# Patient Record
Sex: FEMALE | Race: WHITE | NOT HISPANIC OR LATINO | Employment: FULL TIME | URBAN - METROPOLITAN AREA
[De-identification: names, ages, dates, MRNs, and addresses within clinical notes are randomized per-mention and may not be internally consistent; named-entity substitution may affect disease eponyms.]

---

## 2017-02-22 PROBLEM — K58.9 IBS (IRRITABLE BOWEL SYNDROME): Status: ACTIVE | Noted: 2017-02-22

## 2017-02-22 PROBLEM — K63.5 POLYP OF COLON: Status: ACTIVE | Noted: 2017-02-22

## 2017-02-22 PROBLEM — Z78.0 MENOPAUSE: Status: ACTIVE | Noted: 2017-02-22

## 2017-04-14 ENCOUNTER — LAB (OUTPATIENT)
Dept: INTERNAL MEDICINE | Facility: CLINIC | Age: 57
End: 2017-04-14

## 2017-04-14 DIAGNOSIS — Z00.00 ENCOUNTER FOR ANNUAL PHYSICAL EXAM: Primary | ICD-10-CM

## 2017-04-14 LAB
ALBUMIN SERPL-MCNC: 4.3 G/DL (ref 3.2–4.8)
ALBUMIN/GLOB SERPL: 1.4 G/DL (ref 1.5–2.5)
ALP SERPL-CCNC: 62 U/L (ref 25–100)
ALT SERPL W P-5'-P-CCNC: 17 U/L (ref 7–40)
ANION GAP SERPL CALCULATED.3IONS-SCNC: 6 MMOL/L (ref 3–11)
ARTICHOKE IGE QN: 116 MG/DL (ref 0–130)
AST SERPL-CCNC: 23 U/L (ref 0–33)
BASOPHILS # BLD AUTO: 0.07 10*3/MM3 (ref 0–0.2)
BASOPHILS NFR BLD AUTO: 1.8 % (ref 0–1)
BILIRUB SERPL-MCNC: 0.7 MG/DL (ref 0.3–1.2)
BUN BLD-MCNC: 14 MG/DL (ref 9–23)
BUN/CREAT SERPL: 20 (ref 7–25)
CALCIUM SPEC-SCNC: 9.9 MG/DL (ref 8.7–10.4)
CHLORIDE SERPL-SCNC: 108 MMOL/L (ref 99–109)
CHOLEST SERPL-MCNC: 244 MG/DL (ref 0–200)
CO2 SERPL-SCNC: 27 MMOL/L (ref 20–31)
CREAT BLD-MCNC: 0.7 MG/DL (ref 0.6–1.3)
DEPRECATED RDW RBC AUTO: 45.9 FL (ref 37–54)
EOSINOPHIL # BLD AUTO: 0.16 10*3/MM3 (ref 0.1–0.3)
EOSINOPHIL NFR BLD AUTO: 4 % (ref 0–3)
ERYTHROCYTE [DISTWIDTH] IN BLOOD BY AUTOMATED COUNT: 12.5 % (ref 11.3–14.5)
GFR SERPL CREATININE-BSD FRML MDRD: 86 ML/MIN/1.73
GLOBULIN UR ELPH-MCNC: 3 GM/DL
GLUCOSE BLD-MCNC: 86 MG/DL (ref 70–100)
HCT VFR BLD AUTO: 43.7 % (ref 34.5–44)
HDLC SERPL-MCNC: 110 MG/DL (ref 40–60)
HGB BLD-MCNC: 13.9 G/DL (ref 11.5–15.5)
IMM GRANULOCYTES # BLD: 0 10*3/MM3 (ref 0–0.03)
IMM GRANULOCYTES NFR BLD: 0 % (ref 0–0.6)
LYMPHOCYTES # BLD AUTO: 1.94 10*3/MM3 (ref 0.6–4.8)
LYMPHOCYTES NFR BLD AUTO: 48.7 % (ref 24–44)
MCH RBC QN AUTO: 31.7 PG (ref 27–31)
MCHC RBC AUTO-ENTMCNC: 31.8 G/DL (ref 32–36)
MCV RBC AUTO: 99.8 FL (ref 80–99)
MONOCYTES # BLD AUTO: 0.35 10*3/MM3 (ref 0–1)
MONOCYTES NFR BLD AUTO: 8.8 % (ref 0–12)
NEUTROPHILS # BLD AUTO: 1.46 10*3/MM3 (ref 1.5–8.3)
NEUTROPHILS NFR BLD AUTO: 36.7 % (ref 41–71)
PLATELET # BLD AUTO: 307 10*3/MM3 (ref 150–450)
PMV BLD AUTO: 10.7 FL (ref 6–12)
POTASSIUM BLD-SCNC: 5.2 MMOL/L (ref 3.5–5.5)
PROT SERPL-MCNC: 7.3 G/DL (ref 5.7–8.2)
RBC # BLD AUTO: 4.38 10*6/MM3 (ref 3.89–5.14)
SODIUM BLD-SCNC: 141 MMOL/L (ref 132–146)
TRIGL SERPL-MCNC: 60 MG/DL (ref 0–150)
TSH SERPL DL<=0.05 MIU/L-ACNC: 1.63 MIU/ML (ref 0.35–5.35)
WBC NRBC COR # BLD: 3.98 10*3/MM3 (ref 3.5–10.8)

## 2017-04-14 PROCEDURE — 80061 LIPID PANEL: CPT | Performed by: INTERNAL MEDICINE

## 2017-04-14 PROCEDURE — 80053 COMPREHEN METABOLIC PANEL: CPT | Performed by: INTERNAL MEDICINE

## 2017-04-14 PROCEDURE — 85025 COMPLETE CBC W/AUTO DIFF WBC: CPT | Performed by: INTERNAL MEDICINE

## 2017-04-14 PROCEDURE — 84443 ASSAY THYROID STIM HORMONE: CPT | Performed by: INTERNAL MEDICINE

## 2017-04-16 DIAGNOSIS — D75.89 MACROCYTOSIS: Primary | ICD-10-CM

## 2017-05-03 ENCOUNTER — OFFICE VISIT (OUTPATIENT)
Dept: INTERNAL MEDICINE | Facility: CLINIC | Age: 57
End: 2017-05-03

## 2017-05-03 VITALS
BODY MASS INDEX: 23.19 KG/M2 | SYSTOLIC BLOOD PRESSURE: 104 MMHG | HEIGHT: 62 IN | WEIGHT: 126 LBS | RESPIRATION RATE: 12 BRPM | DIASTOLIC BLOOD PRESSURE: 72 MMHG | TEMPERATURE: 99 F | OXYGEN SATURATION: 98 % | HEART RATE: 77 BPM

## 2017-05-03 DIAGNOSIS — Z00.00 ENCOUNTER FOR ANNUAL PHYSICAL EXAM: Primary | ICD-10-CM

## 2017-05-03 DIAGNOSIS — Z12.11 COLON CANCER SCREENING: ICD-10-CM

## 2017-05-03 LAB
BILIRUB BLD-MCNC: NEGATIVE MG/DL
CLARITY, POC: CLEAR
COLOR UR: YELLOW
GLUCOSE UR STRIP-MCNC: NEGATIVE MG/DL
KETONES UR QL: NEGATIVE
LEUKOCYTE EST, POC: NEGATIVE
NITRITE UR-MCNC: NEGATIVE MG/ML
PH UR: 6.5 [PH] (ref 5–8)
PROT UR STRIP-MCNC: NEGATIVE MG/DL
RBC # UR STRIP: NEGATIVE /UL
SP GR UR: 1.01 (ref 1–1.03)
UROBILINOGEN UR QL: NORMAL

## 2017-05-03 PROCEDURE — 81003 URINALYSIS AUTO W/O SCOPE: CPT | Performed by: INTERNAL MEDICINE

## 2017-05-03 PROCEDURE — 99396 PREV VISIT EST AGE 40-64: CPT | Performed by: INTERNAL MEDICINE

## 2017-05-03 RX ORDER — TRETINOIN 0.5 MG/G
1 CREAM TOPICAL DAILY
COMMUNITY
End: 2018-08-03 | Stop reason: SDUPTHER

## 2017-12-04 ENCOUNTER — TRANSCRIBE ORDERS (OUTPATIENT)
Dept: ADMINISTRATIVE | Facility: HOSPITAL | Age: 57
End: 2017-12-04

## 2017-12-04 DIAGNOSIS — Z12.31 VISIT FOR SCREENING MAMMOGRAM: Primary | ICD-10-CM

## 2018-01-15 ENCOUNTER — HOSPITAL ENCOUNTER (OUTPATIENT)
Dept: MAMMOGRAPHY | Facility: HOSPITAL | Age: 58
Discharge: HOME | End: 2018-01-15
Admitting: INTERNAL MEDICINE

## 2018-01-15 DIAGNOSIS — Z12.31 VISIT FOR SCREENING MAMMOGRAM: ICD-10-CM

## 2018-01-15 PROCEDURE — 77063 BREAST TOMOSYNTHESIS BI: CPT | Performed by: RADIOLOGY

## 2018-01-15 PROCEDURE — 77063 BREAST TOMOSYNTHESIS BI: CPT

## 2018-01-15 PROCEDURE — 77067 SCR MAMMO BI INCL CAD: CPT | Performed by: RADIOLOGY

## 2018-01-15 PROCEDURE — 77067 SCR MAMMO BI INCL CAD: CPT

## 2018-08-02 ENCOUNTER — TELEPHONE (OUTPATIENT)
Dept: INTERNAL MEDICINE | Facility: CLINIC | Age: 58
End: 2018-08-02

## 2018-08-02 NOTE — PROGRESS NOTES
"Here for physical    Exercise: runs daily, weights  Diet: healthy overall, citricel, tumeric. Zija. Not a lot of dairy    The following portions of the patient's history were reviewed and updated as appropriate: allergies, current medications, past family history, past medical history, past social history, past surgical history and problem list.    Review of Systems   Constitutional: Negative for fatigue and fever. no change in weight  HENT: Negative for congestion and sore throat.    Eyes: Negative for visual disturbance.   Respiratory: Negative for cough and shortness of breath.    Cardiovascular: Negative for chest pain, palpitations and leg swelling.   Gastrointestinal: Negative for abdominal distention, abdominal pain, blood in stool, constipation, diarrhea and nausea.   Genitourinary: occasional UTIs;   Musculoskeletal: Negative for arthralgias. L knee meniscus tear - doing better  Skin: Negative for rash.   Allergic/Immunologic: Negative for immunocompromised state.   Neurological: Negative for dizziness and headaches.   Psychiatric/Behavioral: Negative for dysphoric mood and sleep disturbance.       Objective    Vitals:    08/03/18 0825   BP: 102/78   BP Location: Right arm   Pulse: 68   Temp: 98.2 °F (36.8 °C)   TempSrc: Temporal Artery    SpO2: 98%   Weight: 57.3 kg (126 lb 6.4 oz)   Height: 156.8 cm (61.75\")     Physical Exam   Constitutional: She is oriented to person, place, and time. She appears well-developed and well-nourished. No distress.   HENT:   Head: Normocephalic and atraumatic.   Right Ear: External ear normal.   Left Ear: External ear normal.   Mouth/Throat: Oropharynx is clear and moist. No oropharyngeal exudate.   Eyes: Conjunctivae and EOM are normal. Pupils are equal, round, and reactive to light.   Neck: Normal range of motion. Neck supple. No thyromegaly present.   Cardiovascular: Normal rate, regular rhythm, normal heart sounds and intact distal pulses.  Exam reveals no gallop and no " friction rub.    No murmur heard.  Pulmonary/Chest: Effort normal and breath sounds normal. No respiratory distress. She has no wheezes. She has no rales.   Breast exam: no masses, no tenderness, no skin lesions B  Abdominal: Soft. Bowel sounds are normal. She exhibits no mass. There is no tenderness. There is no rebound and no guarding.   Musculoskeletal: Normal range of motion. She exhibits no edema or deformity.   Lymphadenopathy:     She has no cervical adenopathy.   Neurological: She is alert and oriented to person, place, and time. No cranial nerve deficit. She exhibits normal muscle tone. Coordination normal.   Skin: Skin is warm and dry. She is not diaphoretic.   Psychiatric: She has a normal mood and affect. Her behavior is normal. Judgment and thought content normal.       Assessment/Plan   Lila was seen today for annual exam.    Diagnoses and all orders for this visit:    Routine general medical examination at a health care facility  Regular exercise/healthy diet. BSE q month. Sunscreen use encouraged. calcium intake reviewed. Check fasting labs  Lilian due 1/19  Colon due '22 (she had last year - we have requested from Dr Quach's office)  DT due this year  DEXA due 4/21 (normal in 4/16)  Shingles - shingrix discussed -  can check at pharmacy since we do not have   Doesn't meet criteria for lung cancer screening  paps through GYN - [facility] Bob  -     CBC & Differential  -     Comprehensive Metabolic Panel  -     Vitamin D 25 Hydroxy  -     TSH  -     Hepatitis C Antibody  -     Lipid Panel  -     POC Urinalysis Dipstick, Automated    Need for hepatitis C screening test  -     Hepatitis C Antibody    Need for diphtheria-tetanus-pertussis (Tdap) vaccine  -     Tdap Vaccine Greater Than or Equal To 6yo IM

## 2018-08-02 NOTE — TELEPHONE ENCOUNTER
----- Message from Emily Ott MD sent at 8/2/2018  7:51 AM EDT -----  Regarding: colon reprot  Can we see if Pemarycruz's office has a colonoscopy on her from 2017? We had referred her, but not in chart

## 2018-08-03 ENCOUNTER — OFFICE VISIT (OUTPATIENT)
Dept: INTERNAL MEDICINE | Facility: CLINIC | Age: 58
End: 2018-08-03

## 2018-08-03 VITALS
BODY MASS INDEX: 23.26 KG/M2 | HEART RATE: 68 BPM | WEIGHT: 126.4 LBS | OXYGEN SATURATION: 98 % | DIASTOLIC BLOOD PRESSURE: 78 MMHG | TEMPERATURE: 98.2 F | HEIGHT: 62 IN | SYSTOLIC BLOOD PRESSURE: 102 MMHG

## 2018-08-03 DIAGNOSIS — Z23 NEED FOR DIPHTHERIA-TETANUS-PERTUSSIS (TDAP) VACCINE: ICD-10-CM

## 2018-08-03 DIAGNOSIS — Z00.00 ROUTINE GENERAL MEDICAL EXAMINATION AT A HEALTH CARE FACILITY: Primary | ICD-10-CM

## 2018-08-03 DIAGNOSIS — Z11.59 NEED FOR HEPATITIS C SCREENING TEST: ICD-10-CM

## 2018-08-03 LAB
BILIRUB BLD-MCNC: ABNORMAL MG/DL
CLARITY, POC: CLEAR
COLOR UR: YELLOW
GLUCOSE UR STRIP-MCNC: NEGATIVE MG/DL
KETONES UR QL: NEGATIVE
LEUKOCYTE EST, POC: NEGATIVE
NITRITE UR-MCNC: NEGATIVE MG/ML
PH UR: 6 [PH] (ref 5–8)
PROT UR STRIP-MCNC: NEGATIVE MG/DL
RBC # UR STRIP: NEGATIVE /UL
SP GR UR: 1.02 (ref 1–1.03)
UROBILINOGEN UR QL: NORMAL

## 2018-08-03 PROCEDURE — 90471 IMMUNIZATION ADMIN: CPT | Performed by: INTERNAL MEDICINE

## 2018-08-03 PROCEDURE — 90715 TDAP VACCINE 7 YRS/> IM: CPT | Performed by: INTERNAL MEDICINE

## 2018-08-03 PROCEDURE — 99396 PREV VISIT EST AGE 40-64: CPT | Performed by: INTERNAL MEDICINE

## 2018-08-03 PROCEDURE — 81003 URINALYSIS AUTO W/O SCOPE: CPT | Performed by: INTERNAL MEDICINE

## 2018-08-03 RX ORDER — TRETINOIN 0.5 MG/G
1 CREAM TOPICAL DAILY
Qty: 20 G | Refills: 5 | Status: SHIPPED | OUTPATIENT
Start: 2018-08-03 | End: 2019-08-05 | Stop reason: SDUPTHER

## 2019-02-27 ENCOUNTER — TRANSCRIBE ORDERS (OUTPATIENT)
Dept: INTERNAL MEDICINE | Facility: CLINIC | Age: 59
End: 2019-02-27

## 2019-02-27 DIAGNOSIS — Z12.31 VISIT FOR SCREENING MAMMOGRAM: Primary | ICD-10-CM

## 2019-04-15 ENCOUNTER — HOSPITAL ENCOUNTER (OUTPATIENT)
Dept: MAMMOGRAPHY | Facility: HOSPITAL | Age: 59
Discharge: HOME | End: 2019-04-15
Admitting: INTERNAL MEDICINE

## 2019-04-15 DIAGNOSIS — Z12.31 VISIT FOR SCREENING MAMMOGRAM: ICD-10-CM

## 2019-04-15 PROCEDURE — 77063 BREAST TOMOSYNTHESIS BI: CPT | Performed by: RADIOLOGY

## 2019-04-15 PROCEDURE — 77067 SCR MAMMO BI INCL CAD: CPT

## 2019-04-15 PROCEDURE — 77067 SCR MAMMO BI INCL CAD: CPT | Performed by: RADIOLOGY

## 2019-04-15 PROCEDURE — 77063 BREAST TOMOSYNTHESIS BI: CPT

## 2019-04-17 ENCOUNTER — APPOINTMENT (OUTPATIENT)
Dept: MAMMOGRAPHY | Facility: HOSPITAL | Age: 59
End: 2019-04-17

## 2019-08-03 NOTE — PROGRESS NOTES
"Here for physical    Exercise: runs daily, weights  Diet: healthy overall, takes citricel, tumeric. Zija, fish oil. Not a lot of dairy in diet but some    She had blood work done through work  recently and brings her results today.  HDL is 99.  LDL is 127.  Triglycerides are 63.  Total cholesterol is 241.  Glucose is 80.    The following portions of the patient's history were reviewed and updated as appropriate: allergies, current medications, past family history, past medical history, past social history, past surgical history and problem list.    Review of Systems   Constitutional: Negative for activity change, appetite change, fever, unexpected weight gain and unexpected weight loss.   HENT: Negative.    Eyes: Negative.         She did see her eye doctor and had slightly elevated pressures so has to go in for recheck   Respiratory: Negative for shortness of breath and wheezing.    Cardiovascular: Negative for chest pain, palpitations and leg swelling.   Gastrointestinal: Negative.    Endocrine: Negative.    Genitourinary: Negative for difficulty urinating, dysuria and frequency.   Skin: Negative.    Allergic/Immunologic: Negative for immunocompromised state.   Neurological: Negative for seizures, speech difficulty, memory problem and confusion.   Hematological: Does not bruise/bleed easily.   Psychiatric/Behavioral: Negative for agitation.         Objective    /76 (BP Location: Right arm)   Pulse 62   Temp 97.9 °F (36.6 °C) (Temporal)   Ht 156.7 cm (61.7\")   Wt 59 kg (130 lb)   SpO2 99%   BMI 24.01 kg/m²   Physical Exam   Physical Exam   Constitutional: She is oriented to person, place, and time. She appears well-developed and well-nourished. No distress.   HENT:   Head: Normocephalic and atraumatic.   Right Ear: External ear normal.   Left Ear: External ear normal.   Nose: Nose normal.   Mouth/Throat: Oropharynx is clear and moist. No oropharyngeal exudate.   Eyes: Conjunctivae and EOM are normal. " Pupils are equal, round, and reactive to light. Right eye exhibits no discharge. Left eye exhibits no discharge. No scleral icterus.   Neck: Normal range of motion. Neck supple. No thyromegaly present.   Cardiovascular: Normal rate, regular rhythm, normal heart sounds and intact distal pulses. Exam reveals no gallop and no friction rub.   No murmur heard.  Pulmonary/Chest: Effort normal and breath sounds normal. No respiratory distress. She has no wheezes. She has no rales.   Abdominal: Soft. Bowel sounds are normal. She exhibits no distension and no mass. There is no tenderness. There is no rebound and no guarding.   Musculoskeletal: Normal range of motion. She exhibits no edema or deformity.   Lymphadenopathy:     She has no cervical adenopathy.   Neurological: She is alert and oriented to person, place, and time. She displays normal reflexes. Coordination normal.   Skin: Skin is warm and dry. No rash noted. She is not diaphoretic. No erythema. No pallor.   Psychiatric: She has a normal mood and affect. Her behavior is normal. Judgment and thought content normal.   Nursing note and vitals reviewed.        Assessment/Plan   Lila was seen today for annual exam.    Diagnoses and all orders for this visit:    Routine general medical examination at a health care facility  -     Comprehensive Metabolic Panel  -     CBC & Differential  -     TSH  -     POC Urinalysis Dipstick, Automated    Need for hepatitis A immunization  -     Hepatitis A Vaccine Adult IM      Regular exercise/healthy diet. BSE q month. Sunscreen use encouraged. calcium intake reviewed.  She had cholesterol and sugar done recently.  I gave her order for the rest of the physical labs, including hep C screen, to be done at [facility] because of her insurance  Lilian due 4/20  Colon due 6/22 ([facility])  DT due 8/28  Hep A -will give today.  DEXA due 4/21 (normal in 4/16)  Shingles - shingrix discussed -  can check at pharmacy since we do not have   Paps through  GYN-Izabella Vega  UA shows leukocytes but patient without symptoms so will not treat

## 2019-08-05 ENCOUNTER — OFFICE VISIT (OUTPATIENT)
Dept: INTERNAL MEDICINE | Facility: CLINIC | Age: 59
End: 2019-08-05

## 2019-08-05 VITALS
WEIGHT: 130 LBS | BODY MASS INDEX: 23.92 KG/M2 | TEMPERATURE: 97.9 F | HEIGHT: 62 IN | DIASTOLIC BLOOD PRESSURE: 76 MMHG | HEART RATE: 62 BPM | OXYGEN SATURATION: 99 % | SYSTOLIC BLOOD PRESSURE: 118 MMHG

## 2019-08-05 DIAGNOSIS — Z23 NEED FOR HEPATITIS A IMMUNIZATION: ICD-10-CM

## 2019-08-05 DIAGNOSIS — Z00.00 ROUTINE GENERAL MEDICAL EXAMINATION AT A HEALTH CARE FACILITY: Primary | ICD-10-CM

## 2019-08-05 LAB
BILIRUB BLD-MCNC: NEGATIVE MG/DL
CLARITY, POC: CLEAR
COLOR UR: YELLOW
GLUCOSE UR STRIP-MCNC: NEGATIVE MG/DL
KETONES UR QL: NEGATIVE
LEUKOCYTE EST, POC: ABNORMAL
NITRITE UR-MCNC: NEGATIVE MG/ML
PH UR: 8 [PH] (ref 5–8)
PROT UR STRIP-MCNC: NEGATIVE MG/DL
RBC # UR STRIP: NEGATIVE /UL
SP GR UR: 1.01 (ref 1–1.03)
UROBILINOGEN UR QL: NORMAL

## 2019-08-05 PROCEDURE — 90632 HEPA VACCINE ADULT IM: CPT | Performed by: INTERNAL MEDICINE

## 2019-08-05 PROCEDURE — 81003 URINALYSIS AUTO W/O SCOPE: CPT | Performed by: INTERNAL MEDICINE

## 2019-08-05 PROCEDURE — 90471 IMMUNIZATION ADMIN: CPT | Performed by: INTERNAL MEDICINE

## 2019-08-05 PROCEDURE — 99396 PREV VISIT EST AGE 40-64: CPT | Performed by: INTERNAL MEDICINE

## 2019-08-05 RX ORDER — TRETINOIN 0.5 MG/G
1 CREAM TOPICAL DAILY
Qty: 20 G | Refills: 5 | Status: SHIPPED | OUTPATIENT
Start: 2019-08-05 | End: 2020-08-06

## 2019-08-20 ENCOUNTER — OFFICE VISIT (OUTPATIENT)
Dept: INTERNAL MEDICINE | Facility: CLINIC | Age: 59
End: 2019-08-20

## 2019-08-20 VITALS
DIASTOLIC BLOOD PRESSURE: 70 MMHG | OXYGEN SATURATION: 98 % | WEIGHT: 131.4 LBS | SYSTOLIC BLOOD PRESSURE: 110 MMHG | TEMPERATURE: 98.3 F | BODY MASS INDEX: 24.18 KG/M2 | HEIGHT: 62 IN | RESPIRATION RATE: 16 BRPM | HEART RATE: 76 BPM

## 2019-08-20 DIAGNOSIS — L30.9 DERMATITIS: Primary | ICD-10-CM

## 2019-08-20 PROCEDURE — 96372 THER/PROPH/DIAG INJ SC/IM: CPT | Performed by: NURSE PRACTITIONER

## 2019-08-20 PROCEDURE — 99214 OFFICE O/P EST MOD 30 MIN: CPT | Performed by: NURSE PRACTITIONER

## 2019-08-20 RX ORDER — TRIAMCINOLONE ACETONIDE 1 MG/G
CREAM TOPICAL 2 TIMES DAILY
Qty: 45 G | Refills: 0 | Status: SHIPPED | OUTPATIENT
Start: 2019-08-20 | End: 2020-08-06

## 2019-08-20 RX ORDER — DEXAMETHASONE SODIUM PHOSPHATE 4 MG/ML
8 INJECTION, SOLUTION INTRA-ARTICULAR; INTRALESIONAL; INTRAMUSCULAR; INTRAVENOUS; SOFT TISSUE ONCE
Status: COMPLETED | OUTPATIENT
Start: 2019-08-20 | End: 2019-08-20

## 2019-08-20 RX ORDER — METHYLPREDNISOLONE 4 MG/1
TABLET ORAL
Qty: 21 EACH | Refills: 0 | Status: SHIPPED | OUTPATIENT
Start: 2019-08-20 | End: 2020-08-06

## 2019-08-20 RX ADMIN — DEXAMETHASONE SODIUM PHOSPHATE 8 MG: 4 INJECTION, SOLUTION INTRA-ARTICULAR; INTRALESIONAL; INTRAMUSCULAR; INTRAVENOUS; SOFT TISSUE at 09:28

## 2019-08-20 NOTE — PROGRESS NOTES
Subjective   Lila Rodriguez is a 59 y.o. female    Chief Complaint   Patient presents with   • Rash     under the right arm and right side. More itchy than painful as of now.      Rash   This is a new problem. The current episode started yesterday. The problem is unchanged. Location: right arm and right trunck. The rash is characterized by redness and itchiness. It is unknown if there was an exposure to a precipitant. Pertinent negatives include no anorexia, congestion, cough, diarrhea, eye pain, facial edema, fatigue, fever, joint pain, nail changes, rhinorrhea, shortness of breath, sore throat or vomiting. Past treatments include antihistamine. The treatment provided mild relief. Her past medical history is significant for varicella. There is no history of allergies, asthma or eczema.        The following portions of the patient's history were reviewed and updated as appropriate: allergies, current medications, past family history, past medical history, past social history, past surgical history and problem list.    Current Outpatient Medications:   •  methylPREDNISolone (MEDROL) 4 MG tablet, Take as directed on package instructions., Disp: 21 each, Rfl: 0  •  Tretinoin, Facial Wrinkles, (TRETINOIN, EMOLLIENT,) 0.05 % cream, Apply 1 application topically Daily., Disp: 20 g, Rfl: 5  •  triamcinolone (KENALOG) 0.1 % cream, Apply  topically to the appropriate area as directed 2 (Two) Times a Day., Disp: 45 g, Rfl: 0  No current facility-administered medications for this visit.      Review of Systems   Constitutional: Negative for chills, fatigue and fever.   HENT: Negative for congestion, rhinorrhea and sore throat.    Eyes: Negative for pain.   Respiratory: Negative for cough, chest tightness and shortness of breath.    Cardiovascular: Negative for chest pain.   Gastrointestinal: Negative for abdominal pain, anorexia, diarrhea, nausea and vomiting.   Endocrine: Negative for cold intolerance and heat intolerance.  "  Musculoskeletal: Negative for arthralgias and joint pain.   Skin: Positive for rash. Negative for nail changes.   Neurological: Negative for dizziness.       Objective   Physical Exam   Constitutional: She is oriented to person, place, and time. She appears well-developed and well-nourished.   HENT:   Head: Normocephalic and atraumatic.   Eyes: Conjunctivae and EOM are normal. Pupils are equal, round, and reactive to light.   Neck: Normal range of motion.   Cardiovascular: Normal rate, regular rhythm and normal heart sounds.   Pulmonary/Chest: Effort normal and breath sounds normal.   Abdominal: Soft. Bowel sounds are normal.   Musculoskeletal: Normal range of motion.   Neurological: She is alert and oriented to person, place, and time. She has normal reflexes.   Skin: Skin is warm and dry. Rash (right arm and trunk) noted. Rash is maculopapular.   Psychiatric: She has a normal mood and affect. Her behavior is normal. Judgment and thought content normal.     Vitals:    08/20/19 0903   BP: 110/70   Pulse: 76   Resp: 16   Temp: 98.3 °F (36.8 °C)   TempSrc: Temporal   SpO2: 98%   Weight: 59.6 kg (131 lb 6.4 oz)   Height: 156.7 cm (61.69\")         Assessment/Plan   Lila was seen today for rash.    Diagnoses and all orders for this visit:    Dermatitis  -     dexamethasone (DECADRON) injection 8 mg  -     methylPREDNISolone (MEDROL) 4 MG tablet; Take as directed on package instructions.  -     triamcinolone (KENALOG) 0.1 % cream; Apply  topically to the appropriate area as directed 2 (Two) Times a Day.    Steroid injection given in office today  Medrol Dosepak as directed to start in the morning  Triamcinolone cream to rash twice daily  Patient will take Claritin in the a.m. and Benadryl at night as needed  Return to the clinic if symptoms worsen or do not improve             "

## 2020-06-03 ENCOUNTER — TRANSCRIBE ORDERS (OUTPATIENT)
Dept: ADMINISTRATIVE | Facility: HOSPITAL | Age: 60
End: 2020-06-03

## 2020-06-03 DIAGNOSIS — Z12.31 VISIT FOR SCREENING MAMMOGRAM: Primary | ICD-10-CM

## 2020-08-06 ENCOUNTER — OFFICE VISIT (OUTPATIENT)
Dept: INTERNAL MEDICINE | Facility: CLINIC | Age: 60
End: 2020-08-06

## 2020-08-06 ENCOUNTER — LAB (OUTPATIENT)
Dept: LAB | Facility: HOSPITAL | Age: 60
End: 2020-08-06

## 2020-08-06 VITALS
HEART RATE: 72 BPM | HEIGHT: 62 IN | OXYGEN SATURATION: 99 % | BODY MASS INDEX: 23.63 KG/M2 | TEMPERATURE: 97.3 F | WEIGHT: 128.4 LBS | SYSTOLIC BLOOD PRESSURE: 104 MMHG | DIASTOLIC BLOOD PRESSURE: 66 MMHG

## 2020-08-06 DIAGNOSIS — Z00.00 ENCOUNTER FOR ANNUAL PHYSICAL EXAM: Primary | ICD-10-CM

## 2020-08-06 DIAGNOSIS — F41.8 SITUATIONAL ANXIETY: ICD-10-CM

## 2020-08-06 DIAGNOSIS — E55.9 VITAMIN D DEFICIENCY: ICD-10-CM

## 2020-08-06 DIAGNOSIS — Z23 NEED FOR HEPATITIS A IMMUNIZATION: ICD-10-CM

## 2020-08-06 DIAGNOSIS — Z00.00 ENCOUNTER FOR ANNUAL PHYSICAL EXAM: ICD-10-CM

## 2020-08-06 LAB
25(OH)D3 SERPL-MCNC: 37.3 NG/ML (ref 30–100)
ALBUMIN SERPL-MCNC: 4.4 G/DL (ref 3.5–5.2)
ALBUMIN/GLOB SERPL: 2 G/DL
ALP SERPL-CCNC: 52 U/L (ref 39–117)
ALT SERPL W P-5'-P-CCNC: 17 U/L (ref 1–33)
ANION GAP SERPL CALCULATED.3IONS-SCNC: 9.7 MMOL/L (ref 5–15)
AST SERPL-CCNC: 18 U/L (ref 1–32)
BILIRUB BLD-MCNC: NEGATIVE MG/DL
BILIRUB SERPL-MCNC: 0.4 MG/DL (ref 0–1.2)
BUN SERPL-MCNC: 13 MG/DL (ref 8–23)
BUN/CREAT SERPL: 17.1 (ref 7–25)
CALCIUM SPEC-SCNC: 9.4 MG/DL (ref 8.6–10.5)
CHLORIDE SERPL-SCNC: 106 MMOL/L (ref 98–107)
CHOLEST SERPL-MCNC: 230 MG/DL (ref 0–200)
CLARITY, POC: CLEAR
CO2 SERPL-SCNC: 25.3 MMOL/L (ref 22–29)
COLOR UR: YELLOW
CREAT SERPL-MCNC: 0.76 MG/DL (ref 0.57–1)
DEPRECATED RDW RBC AUTO: 46.5 FL (ref 37–54)
ERYTHROCYTE [DISTWIDTH] IN BLOOD BY AUTOMATED COUNT: 12.5 % (ref 12.3–15.4)
GFR SERPL CREATININE-BSD FRML MDRD: 78 ML/MIN/1.73
GLOBULIN UR ELPH-MCNC: 2.2 GM/DL
GLUCOSE SERPL-MCNC: 91 MG/DL (ref 65–99)
GLUCOSE UR STRIP-MCNC: NEGATIVE MG/DL
HCT VFR BLD AUTO: 41.6 % (ref 34–46.6)
HDLC SERPL-MCNC: 91 MG/DL (ref 40–60)
HGB BLD-MCNC: 13.5 G/DL (ref 12–15.9)
KETONES UR QL: NEGATIVE
LDLC SERPL CALC-MCNC: 131 MG/DL (ref 0–100)
LDLC/HDLC SERPL: 1.44 {RATIO}
LEUKOCYTE EST, POC: NEGATIVE
MCH RBC QN AUTO: 32.4 PG (ref 26.6–33)
MCHC RBC AUTO-ENTMCNC: 32.5 G/DL (ref 31.5–35.7)
MCV RBC AUTO: 99.8 FL (ref 79–97)
NITRITE UR-MCNC: NEGATIVE MG/ML
PH UR: 6 [PH] (ref 5–8)
PLATELET # BLD AUTO: 264 10*3/MM3 (ref 140–450)
PMV BLD AUTO: 10.6 FL (ref 6–12)
POTASSIUM SERPL-SCNC: 4.6 MMOL/L (ref 3.5–5.2)
PROT SERPL-MCNC: 6.6 G/DL (ref 6–8.5)
PROT UR STRIP-MCNC: NEGATIVE MG/DL
RBC # BLD AUTO: 4.17 10*6/MM3 (ref 3.77–5.28)
RBC # UR STRIP: NEGATIVE /UL
SODIUM SERPL-SCNC: 141 MMOL/L (ref 136–145)
SP GR UR: 1.02 (ref 1–1.03)
TRIGL SERPL-MCNC: 39 MG/DL (ref 0–150)
TSH SERPL DL<=0.05 MIU/L-ACNC: 1.32 UIU/ML (ref 0.27–4.2)
UROBILINOGEN UR QL: NORMAL
VLDLC SERPL-MCNC: 7.8 MG/DL (ref 5–40)
WBC # BLD AUTO: 4.43 10*3/MM3 (ref 3.4–10.8)

## 2020-08-06 PROCEDURE — 90632 HEPA VACCINE ADULT IM: CPT | Performed by: INTERNAL MEDICINE

## 2020-08-06 PROCEDURE — 99396 PREV VISIT EST AGE 40-64: CPT | Performed by: INTERNAL MEDICINE

## 2020-08-06 PROCEDURE — 82306 VITAMIN D 25 HYDROXY: CPT

## 2020-08-06 PROCEDURE — 36415 COLL VENOUS BLD VENIPUNCTURE: CPT

## 2020-08-06 PROCEDURE — 80061 LIPID PANEL: CPT

## 2020-08-06 PROCEDURE — 90471 IMMUNIZATION ADMIN: CPT | Performed by: INTERNAL MEDICINE

## 2020-08-06 PROCEDURE — 85027 COMPLETE CBC AUTOMATED: CPT

## 2020-08-06 PROCEDURE — 81003 URINALYSIS AUTO W/O SCOPE: CPT | Performed by: INTERNAL MEDICINE

## 2020-08-06 PROCEDURE — 80053 COMPREHEN METABOLIC PANEL: CPT

## 2020-08-06 PROCEDURE — 84443 ASSAY THYROID STIM HORMONE: CPT

## 2020-08-06 RX ORDER — ALPRAZOLAM 0.25 MG/1
TABLET ORAL
Qty: 10 TABLET | Refills: 2 | Status: SHIPPED | OUTPATIENT
Start: 2020-08-06 | End: 2021-06-23 | Stop reason: SDUPTHER

## 2020-08-06 NOTE — PROGRESS NOTES
Here for physical    Exercise: runs daily, weights  Diet: healthy overall, takes citricel, tumeric. Zija, fish oil. Not a lot of dairy in diet but some    Vitamin D level was slightly low last year at 28. She is not on any D, but is going to [place] more and in sun a lot    Situational anxiety - usually w/ flying. Not always an issue    Right IT band gave her some problems in May when she was running daily on the beach. She saw chiropractor and is better and back to running, though going a little slower    Current Outpatient Medications:   •  methylPREDNISolone (MEDROL) 4 MG tablet, Take as directed on package instructions., Disp: 21 each, Rfl: 0  •  Tretinoin, Facial Wrinkles, (TRETINOIN, EMOLLIENT,) 0.05 % cream, Apply 1 application topically Daily., Disp: 20 g, Rfl: 5  •  triamcinolone (KENALOG) 0.1 % cream, Apply  topically to the appropriate area as directed 2 (Two) Times a Day., Disp: 45 g, Rfl: 0    The following portions of the patient's history were reviewed and updated as appropriate: allergies, current medications, past family history, past medical history, past social history, past surgical history and problem list.    Review of Systems   Constitutional: Negative for activity change, appetite change, fever, unexpected weight gain and unexpected weight loss.   HENT: Negative.    Eyes: Negative.    Respiratory: Negative for shortness of breath and wheezing.    Cardiovascular: Negative for chest pain, palpitations and leg swelling.   Gastrointestinal: Negative.    Endocrine: Negative.    Genitourinary: Negative for difficulty urinating and dysuria.   Skin: Negative.    Allergic/Immunologic: Negative for immunocompromised state.   Neurological: Negative for seizures, speech difficulty, memory problem and confusion.   Hematological: Does not bruise/bleed easily.   Psychiatric/Behavioral: Negative for agitation. The patient is nervous/anxious (w/ flying at times).          Objective    /66 (BP Location:  "Right arm, Patient Position: Sitting)   Pulse 72   Temp 97.3 °F (36.3 °C) (Infrared)   Ht 158.2 cm (62.3\")   Wt 58.2 kg (128 lb 6.4 oz)   SpO2 99%   BMI 23.26 kg/m²   Physical Exam   Physical Exam   Constitutional: She is oriented to person, place, and time. She appears well-developed and well-nourished. No distress.   HENT:   Head: Normocephalic and atraumatic.   Right Ear: External ear normal.   Left Ear: External ear normal.   Nose: Nose normal.   Mouth/Throat: Oropharynx is clear and moist. No oropharyngeal exudate.   Eyes: Pupils are equal, round, and reactive to light. Conjunctivae and EOM are normal. Right eye exhibits no discharge. Left eye exhibits no discharge. No scleral icterus.   Neck: Normal range of motion. Neck supple. No thyromegaly present.   Cardiovascular: Normal rate, regular rhythm, normal heart sounds and intact distal pulses. Exam reveals no gallop and no friction rub.   No murmur heard.  Pulmonary/Chest: Effort normal and breath sounds normal. No respiratory distress. She has no wheezes. She has no rales.   Abdominal: Soft. Bowel sounds are normal. She exhibits no distension and no mass. There is no tenderness. There is no rebound and no guarding.   Musculoskeletal: Normal range of motion. She exhibits no edema or deformity.   Lymphadenopathy:     She has no cervical adenopathy.   Neurological: She is alert and oriented to person, place, and time. She displays normal reflexes. Coordination normal.   Skin: Skin is warm and dry. No rash noted. She is not diaphoretic. No erythema. No pallor.   Psychiatric: She has a normal mood and affect. Her behavior is normal. Judgment and thought content normal.   Nursing note and vitals reviewed.        Assessment/Plan   Lila was seen today for annual exam.    Diagnoses and all orders for this visit:    Encounter for annual physical exam  Regular exercise/healthy diet. BSE q month. Sunscreen use encouraged. calcium intake reviewed. Check fasting " labs  Lilian scheduled for next month  Colon due 6/22 (Pezzi)  DT due 8/28  Hep A -#2-will give today.  DEXA due 4/21 (normal in 4/16)  Shingles -had Shingrix   Paps through GYN-Izabella Vega  -     POC Urinalysis Dipstick, Automated  -     CBC (No Diff); Future  -     Comprehensive Metabolic Panel; Future  -     Lipid Panel; Future  -     TSH Rfx On Abnormal To Free T4; Future  -     Vitamin D 25 Hydroxy; Future    Need for hepatitis A immunization  -     Hepatitis A Vaccine Adult IM    Vitamin D deficiency- recheck today  -     Vitamin D 25 Hydroxy; Future    Situational anxiety- for flying. Has taken before and tolerated. Controlled substance contract reviewed and signed by pt. Adverse effects and risks of addiction of medication reviewed with pt. Cameron done today and appropriate.   -     ALPRAZolam (Xanax) 0.25 MG tablet; 1/2-1 qd as needed for flight anxiety

## 2020-09-08 ENCOUNTER — APPOINTMENT (OUTPATIENT)
Dept: MAMMOGRAPHY | Facility: HOSPITAL | Age: 60
End: 2020-09-08

## 2020-09-16 ENCOUNTER — HOSPITAL ENCOUNTER (OUTPATIENT)
Dept: MAMMOGRAPHY | Facility: HOSPITAL | Age: 60
Discharge: HOME | End: 2020-09-16
Admitting: INTERNAL MEDICINE

## 2020-09-16 DIAGNOSIS — Z12.31 VISIT FOR SCREENING MAMMOGRAM: ICD-10-CM

## 2020-09-16 PROCEDURE — 77067 SCR MAMMO BI INCL CAD: CPT | Performed by: RADIOLOGY

## 2020-09-16 PROCEDURE — 77063 BREAST TOMOSYNTHESIS BI: CPT

## 2020-09-16 PROCEDURE — 77063 BREAST TOMOSYNTHESIS BI: CPT | Performed by: RADIOLOGY

## 2020-09-16 PROCEDURE — 77067 SCR MAMMO BI INCL CAD: CPT

## 2021-02-25 ENCOUNTER — IMMUNIZATION (OUTPATIENT)
Dept: VACCINE CLINIC | Facility: HOSPITAL | Age: 61
End: 2021-02-25

## 2021-02-25 PROCEDURE — 0011A: CPT | Performed by: INTERNAL MEDICINE

## 2021-02-25 PROCEDURE — 91301 HC SARSCO02 VAC 100MCG/0.5ML IM: CPT | Performed by: INTERNAL MEDICINE

## 2021-04-01 ENCOUNTER — IMMUNIZATION (OUTPATIENT)
Dept: VACCINE CLINIC | Facility: HOSPITAL | Age: 61
End: 2021-04-01

## 2021-04-01 PROCEDURE — 91301 HC SARSCO02 VAC 100MCG/0.5ML IM: CPT | Performed by: INTERNAL MEDICINE

## 2021-04-01 PROCEDURE — 0012A: CPT | Performed by: INTERNAL MEDICINE

## 2021-04-05 ENCOUNTER — APPOINTMENT (OUTPATIENT)
Dept: VACCINE CLINIC | Facility: HOSPITAL | Age: 61
End: 2021-04-05

## 2021-06-23 ENCOUNTER — OFFICE VISIT (OUTPATIENT)
Dept: INTERNAL MEDICINE | Facility: CLINIC | Age: 61
End: 2021-06-23

## 2021-06-23 VITALS
WEIGHT: 129.4 LBS | HEART RATE: 81 BPM | BODY MASS INDEX: 23.81 KG/M2 | HEIGHT: 62 IN | DIASTOLIC BLOOD PRESSURE: 80 MMHG | SYSTOLIC BLOOD PRESSURE: 120 MMHG | OXYGEN SATURATION: 98 % | TEMPERATURE: 98 F

## 2021-06-23 DIAGNOSIS — F41.8 SITUATIONAL ANXIETY: ICD-10-CM

## 2021-06-23 DIAGNOSIS — K58.0 IRRITABLE BOWEL SYNDROME WITH DIARRHEA: Primary | ICD-10-CM

## 2021-06-23 LAB
AMPHET+METHAMPHET UR QL: NEGATIVE
AMPHETAMINES UR QL: NEGATIVE
BARBITURATES UR QL SCN: NEGATIVE
BENZODIAZ UR QL SCN: POSITIVE
BUPRENORPHINE SERPL-MCNC: NEGATIVE NG/ML
CANNABINOIDS SERPL QL: NEGATIVE
COCAINE UR QL: NEGATIVE
METHADONE UR QL SCN: NEGATIVE
OPIATES UR QL: NEGATIVE
OXYCODONE UR QL SCN: NEGATIVE
PCP UR QL SCN: NEGATIVE
PROPOXYPH UR QL: NEGATIVE
TRICYCLICS UR QL SCN: NEGATIVE

## 2021-06-23 PROCEDURE — 80306 DRUG TEST PRSMV INSTRMNT: CPT | Performed by: PHYSICIAN ASSISTANT

## 2021-06-23 PROCEDURE — 99213 OFFICE O/P EST LOW 20 MIN: CPT | Performed by: PHYSICIAN ASSISTANT

## 2021-06-23 RX ORDER — SOD SULF/POT CHLORIDE/MAG SULF 1.479 G
1 TABLET ORAL ONCE
Qty: 24 TABLET | Refills: 0 | Status: SHIPPED | OUTPATIENT
Start: 2021-06-23 | End: 2021-06-23

## 2021-06-23 RX ORDER — ALPRAZOLAM 0.25 MG/1
TABLET ORAL
Qty: 30 TABLET | Refills: 0 | Status: SHIPPED | OUTPATIENT
Start: 2021-06-23 | End: 2021-08-18 | Stop reason: SDUPTHER

## 2021-06-23 NOTE — ASSESSMENT & PLAN NOTE
Chronic, worsening. Gave number for [facility] to call and sched a f/u with them due to change in bowel habits, she may need colonoscopy.  Adv take 1/2-1 imodium in the morning for a few days until sx ease up. Low FODMAP eating plan suggested.   If sx are not improving over the next few weeks or worsen please follow up for labs/stool studies.

## 2021-06-23 NOTE — PROGRESS NOTES
Chief Complaint  Irritable Bowel Syndrome    Subjective          History of Present Illness  Lila Rodriguez presents to [facility] for   IBS:  Dx with IBS 15 yrs ago, had not had severe sx for the last 10 years, will have occ bouts of diarrhea that ease off over a few days but this one has lasted a month. She will have diarrhea/loose stools 3-4 times in the morning more days than not over the last month. No blood in stools. Does have a foul odor and gas, no mucous, has not taken antibiotics in the last few months or eaten anything questionable. No one else is having diarrhea. No fevers. Not stomach pain or cramping with her BMs. Some of the days over the last month she had relatively normal stools, no recent constipation. Did take imodium a few times but does not like to take medication. She feels her anxiety about the IBS has made the IBS worse as well.   No warning signs when it happens, is worried she will have accidents on herself.  Stools are loose, not very watery, has urgency and but no cramping with it.  Did have a big work up for diarrhea when it started, had colonoscopy that showed polyps, is due for repeat in 1 yr.     Anxiety:  Takes xanax prn for anxiety related to flying and travel. Last rx was a year ago. She is requesting a refill.       Review of Systems   Constitutional: Negative for fever and unexpected weight loss.   Respiratory: Negative for cough, shortness of breath and wheezing.    Cardiovascular: Negative for chest pain and palpitations.   Gastrointestinal: Positive for diarrhea. Negative for abdominal pain, constipation, nausea and vomiting.       The following portions of the patient's history were reviewed and updated as appropriate: allergies, current medications, past family history, past medical history, past social history, past surgical history and problem list.  No Known Allergies  Current Outpatient Medications on File Prior to Visit   Medication  "Sig Dispense Refill   • methylcellulose, Laxative, (Citrucel) 500 MG tablet tablet Take 4 tablets by mouth Every 4 (Four) Hours As Needed.     • TURMERIC PO Take 2 tablets by mouth Daily.     • [DISCONTINUED] ALPRAZolam (Xanax) 0.25 MG tablet 1/2-1 qd as needed for flight anxiety 10 tablet 2     No current facility-administered medications on file prior to visit.     No orders of the defined types were placed in this encounter.      Social History     Tobacco Use   Smoking Status Former Smoker   • Packs/day: 1.00   • Years: 20.00   • Pack years: 20.00   • Types: Cigarettes   • Quit date: 2001   • Years since quittin.4   Tobacco Comment    quit age 41 1ppd x 20 yrs        Objective   Vital Signs:   Vitals:    21 0806   BP: 120/80   Pulse: 81   Temp: 98 °F (36.7 °C)   TempSrc: Temporal   SpO2: 98%   Weight: 58.7 kg (129 lb 6.4 oz)   Height: 158.2 cm (62.28\")      Physical Exam  Vitals reviewed.   Constitutional:       General: She is not in acute distress.     Appearance: Normal appearance.   HENT:      Head: Normocephalic and atraumatic.   Eyes:      General: No scleral icterus.     Extraocular Movements: Extraocular movements intact.      Conjunctiva/sclera: Conjunctivae normal.   Cardiovascular:      Rate and Rhythm: Normal rate and regular rhythm.      Heart sounds: Normal heart sounds. No murmur heard.     Pulmonary:      Effort: Pulmonary effort is normal. No respiratory distress.      Breath sounds: Normal breath sounds. No stridor. No wheezing or rhonchi.   Abdominal:      General: Bowel sounds are normal. There is no distension.      Palpations: Abdomen is soft. There is no mass.      Tenderness: There is no abdominal tenderness. There is no guarding or rebound.      Hernia: No hernia is present.   Musculoskeletal:      Cervical back: Normal range of motion and neck supple.   Skin:     General: Skin is warm and dry.      Coloration: Skin is not jaundiced.   Neurological:      General: No focal " deficit present.      Mental Status: She is alert and oriented to person, place, and time.      Gait: Gait normal.   Psychiatric:         Mood and Affect: Mood normal.         Behavior: Behavior normal.          Result Review :                   Assessment and Plan    Diagnoses and all orders for this visit:    1. Irritable bowel syndrome with diarrhea (Primary)  Assessment & Plan:  Chronic, worsening. Gave number for [facility] to call and sched a f/u with them due to change in bowel habits, she may need colonoscopy.  Adv take 1/2-1 imodium in the morning for a few days until sx ease up. Low FODMAP eating plan suggested.   If sx are not improving over the next few weeks or worsen please follow up for labs/stool studies.       2. Situational anxiety  Assessment & Plan:  Discuss with PCP, UDS done. Refill on xanax for prn anxiety related to flying/travel.    Orders:  -     Urine Drug Screen - Urine, Clean Catch; Future  -     Urine Drug Screen - Urine, Clean Catch        Follow Up   No follow-ups on file.      Follow up if symptoms worsen or persist or has new or concerning symptoms, go to ER for severe symptoms.   Reviewed common medication effects and side effects and advised to report side effects immediately, the patient expressed good understanding.  Encouraged medication compliance and the importance of keeping scheduled follow up appointments with me and any other providers  If labs or images are ordered we will contact you with the results within the next week.  If you have not heard from us after a week please call our office to inquire about the results.   Patient was given instructions and counseling regarding her condition or for health maintenance advice. Please see specific information pulled into the AVS if appropriate.     Heather Green PA-C    * Please note that portions of this note may have been completed with a voice recognition program. Efforts were made to edit the dictation but  occasionally words are erroneously transcribed.

## 2021-06-23 NOTE — PATIENT INSTRUCTIONS
Low-FODMAP Eating Plan       FODMAPs (fermentable oligosaccharides, disaccharides, monosaccharides, and polyols) are sugars that are hard for some people to digest. A low-FODMAP eating plan may help some people who have bowel (intestinal) diseases to manage their symptoms.  This meal plan can be complicated to follow. Work with a diet and nutrition specialist (dietitian) to make a low-FODMAP eating plan that is right for you. A dietitian can make sure that you get enough nutrition from this diet.  What are tips for following this plan?  Reading food labels  · Check labels for hidden FODMAPs such as:  ? High-fructose syrup.  ? Honey.  ? Agave.  ? Natural fruit flavors.  ? Onion or garlic powder.  · Choose low-FODMAP foods that contain 3-4 grams of fiber per serving.  · Check food labels for serving sizes. Eat only one serving at a time to make sure FODMAP levels stay low.  Meal planning  · Follow a low-FODMAP eating plan for up to 6 weeks, or as told by your health care provider or dietitian.  · To follow the eating plan:  1. Eliminate high-FODMAP foods from your diet completely.  2. Gradually reintroduce high-FODMAP foods into your diet one at a time. Most people should wait a few days after introducing one high-FODMAP food before they introduce the next high-FODMAP food. Your dietitian can recommend how quickly you may reintroduce foods.  3. Keep a daily record of what you eat and drink, and make note of any symptoms that you have after eating.  4. Review your daily record with a dietitian regularly. Your dietitian can help you identify which foods you can eat and which foods you should avoid.  General tips  · Drink enough fluid each day to keep your urine pale yellow.  · Avoid processed foods. These often have added sugar and may be high in FODMAPs.  · Avoid most dairy products, whole grains, and sweeteners.  · Work with a dietitian to make sure you get enough fiber in your diet.  Recommended  "foods  Grains  · Gluten-free grains, such as rice, oats, buckwheat, quinoa, corn, polenta, and millet. Gluten-free pasta, bread, or cereal. Rice noodles. Corn tortillas.  Vegetables  · Eggplant, zucchini, cucumber, peppers, green beans, [place] sprouts, bean sprouts, lettuce, arugula, kale, Swiss chard, spinach, royal greens, bok kasia, summer squash, potato, and tomato. Limited amounts of corn, carrot, and sweet potato. Green parts of scallions.  Fruits  · Bananas, oranges, david, limes, blueberries, raspberries, strawberries, grapes, cantaloupe, honeydew melon, kiwi, papaya, passion fruit, and pineapple. Limited amounts of dried cranberries, banana chips, and shredded coconut.  Dairy  · Lactose-free milk, yogurt, and kefir. Lactose-free cottage cheese and ice cream. Non-dairy milks, such as almond, coconut, hemp, and rice milk. Yogurts made of non-dairy milks. Limited amounts of goat cheese, brie, mozzarella, parmesan, swiss, and other hard cheeses.  Meats and other protein foods  · Unseasoned beef, pork, poultry, or fish. Eggs. Michael. Tofu (firm) and tempeh. Limited amounts of nuts and seeds, such as almonds, walnuts, brazil nuts, pecans, peanuts, pumpkin seeds, miko seeds, and sunflower seeds.  Fats and oils  · Butter-free spreads. Vegetable oils, such as olive, canola, and sunflower oil.  Seasoning and other foods  · Artificial sweeteners with names that do not end in \"ol\" such as aspartame, saccharine, and stevia. Maple syrup, white table sugar, raw sugar, brown sugar, and molasses. Fresh basil, coriander, parsley, rosemary, and thyme.  Beverages  · Water and mineral water. Sugar-sweetened soft drinks. Small amounts of orange juice or cranberry juice. Black and green tea. Most dry sophie. Coffee.  This may not be a complete list of low-FODMAP foods. Talk with your dietitian for more information.  Foods to avoid  Grains  · Wheat, including kamut, durum, and semolina. Barley and bulgur. Couscous. Wheat-based " cereals. Wheat noodles, bread, crackers, and pastries.  Vegetables  · Chicory root, artichoke, asparagus, cabbage, snow peas, sugar snap peas, mushrooms, and cauliflower. Onions, garlic, leeks, and the white part of scallions.  Fruits  · Fresh, dried, and juiced forms of apple, pear, watermelon, peach, plum, cherries, apricots, blackberries, boysenberries, figs, nectarines, and yahir. Avocado.  Dairy  · Milk, yogurt, ice cream, and soft cheese. Cream and sour cream. Milk-based sauces. Custard.  Meats and other protein foods  · Fried or fatty meat. Sausage. Cashews and pistachios. Soybeans, baked beans, black beans, chickpeas, kidney beans, joleen beans, navy beans, lentils, and split peas.  Seasoning and other foods  · Any sugar-free gum or candy. Foods that contain artificial sweeteners such as sorbitol, mannitol, isomalt, or xylitol. Foods that contain honey, high-fructose corn syrup, or agave. Bouillon, vegetable stock, beef stock, and chicken stock. Garlic and onion powder. Condiments made with onion, such as hummus, chutney, pickles, relish, salad dressing, and salsa. Tomato paste.  Beverages  · Chicory-based drinks. Coffee substitutes. Chamomile tea. Fennel tea. Sweet or fortified sophie such as port or dustin. Diet soft drinks made with isomalt, mannitol, maltitol, sorbitol, or xylitol. Apple, pear, and yahir juice. Juices with high-fructose corn syrup.  This may not be a complete list of high-FODMAP foods. Talk with your dietitian to discuss what dietary choices are best for you.   Summary  · A low-FODMAP eating plan is a short-term diet that eliminates FODMAPs from your diet to help ease symptoms of certain bowel diseases.  · The eating plan usually lasts up to 6 weeks. After that, high-FODMAP foods are restarted gradually, one at a time, so you can find out which may be causing symptoms.  · A low-FODMAP eating plan can be complicated. It is best to work with a dietitian who has experience with this type of  plan.  This information is not intended to replace advice given to you by your health care provider. Make sure you discuss any questions you have with your health care provider.  Document Revised: 11/30/2018 Document Reviewed: 08/14/2018  Elsevier Patient Education © 2021 [employer] Inc[employer]

## 2021-07-29 RX ORDER — SODIUM, POTASSIUM,MAG SULFATES 17.5-3.13G
SOLUTION, RECONSTITUTED, ORAL ORAL
Qty: 1 ML | Refills: 0 | Status: SHIPPED | OUTPATIENT
Start: 2021-07-29 | End: 2021-08-18

## 2021-08-09 ENCOUNTER — TELEPHONE (OUTPATIENT)
Dept: GASTROENTEROLOGY | Facility: CLINIC | Age: 61
End: 2021-08-09

## 2021-08-09 NOTE — TELEPHONE ENCOUNTER
Patient called in today stating that she wanted SUTAB and we had prescribed a liquid, she would like that changed.     I called her [facility]- [facility] [place] [facility] with sutab info and the following coupon code for her:     BIN: [id number]  DORCASN: JAVIER  GROUP: [id number]  MEMBER ID: [id number]

## 2021-08-11 ENCOUNTER — OUTSIDE FACILITY SERVICE (OUTPATIENT)
Dept: GASTROENTEROLOGY | Facility: CLINIC | Age: 61
End: 2021-08-11

## 2021-08-11 PROCEDURE — 45385 COLONOSCOPY W/LESION REMOVAL: CPT | Performed by: INTERNAL MEDICINE

## 2021-08-11 PROCEDURE — 88305 TISSUE EXAM BY PATHOLOGIST: CPT | Performed by: INTERNAL MEDICINE

## 2021-08-12 ENCOUNTER — LAB REQUISITION (OUTPATIENT)
Dept: LAB | Facility: HOSPITAL | Age: 61
End: 2021-08-12

## 2021-08-12 DIAGNOSIS — K63.5 POLYP OF COLON: ICD-10-CM

## 2021-08-12 DIAGNOSIS — R19.7 DIARRHEA, UNSPECIFIED: ICD-10-CM

## 2021-08-12 DIAGNOSIS — Z83.71 FAMILY HISTORY OF COLONIC POLYPS: ICD-10-CM

## 2021-08-13 LAB
CYTO UR: NORMAL
LAB AP CASE REPORT: NORMAL
LAB AP CLINICAL INFORMATION: NORMAL
PATH REPORT.FINAL DX SPEC: NORMAL
PATH REPORT.GROSS SPEC: NORMAL

## 2021-08-16 NOTE — PROGRESS NOTES
Here for physical    Exercise: runs daily, weights  Diet: healthy overall, takes citricel,  Zijal. Not a lot of dairy in diet but some.     Vitamin D deficiency last D last year was good at 37. Not on D currently     Situational anxiety -uses xanax when she travels on airplanes and works well. Would like to get a refill today as she has several trips planned. Her daughter will be having a baby this fall, and she lives in [place].       IBS - had done well for last 9 years but in May had a bad flare w/ diarrhea and fecal incontinence. She started using culturelle, which has seemed to help. Solid BMs last few weeks.  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  She doesn't feel it is stress related generally, but when it starts, she does get more anxious  Does fiber regualrly as well  Had colon done this month and no colitis on random biopsy, but did have 2 polyps and will need a f/u colon in 6m        Current Outpatient Medications:   •  ALPRAZolam (Xanax) 0.25 MG tablet, 1/2-1 qd as needed for anxiety, Disp: 30 tablet, Rfl: 2  •  methylcellulose, Laxative, (Citrucel) 500 MG tablet tablet, Take 4 tablets by mouth Every 4 (Four) Hours As Needed., Disp: , Rfl:   •  TURMERIC PO, Take 2 tablets by mouth Daily., Disp: , Rfl:     The following portions of the patient's history were reviewed and updated as appropriate: allergies, current medications, past family history, past medical history, past social history, past surgical history and problem list.    Review of Systems   Constitutional: Negative for activity change, appetite change, fatigue, fever, unexpected weight gain and unexpected weight loss.   HENT: Negative.    Eyes: Negative.    Respiratory: Negative for shortness of breath and wheezing.    Cardiovascular: Negative for chest pain, palpitations and leg swelling.   Gastrointestinal: Positive for diarrhea (better last 2 weeks). Negative for constipation.  "  Endocrine: Negative.    Genitourinary: Negative for difficulty urinating and dysuria.   Skin: Negative.    Allergic/Immunologic: Negative for immunocompromised state.   Neurological: Negative for seizures, speech difficulty, memory problem and confusion.   Hematological: Does not bruise/bleed easily.   Psychiatric/Behavioral: Negative for agitation. Hyperactivity: w/ flying. The patient is nervous/anxious.          Objective    /66 (BP Location: Right arm, Patient Position: Sitting)   Pulse 70   Temp 97.5 °F (36.4 °C) (Infrared)   Ht 157.5 cm (62\")   Wt 58.4 kg (128 lb 12.8 oz)   SpO2 100%   BMI 23.56 kg/m²   Physical Exam   Physical Exam  Vitals and nursing note reviewed.   Constitutional:       General: She is not in acute distress.     Appearance: She is well-developed. She is not diaphoretic.   HENT:      Head: Normocephalic and atraumatic.      Right Ear: External ear normal.      Left Ear: External ear normal.      Nose: Nose normal.      Mouth/Throat:      Pharynx: No oropharyngeal exudate.   Eyes:      General: No scleral icterus.        Right eye: No discharge.         Left eye: No discharge.      Conjunctiva/sclera: Conjunctivae normal.      Pupils: Pupils are equal, round, and reactive to light.   Neck:      Thyroid: No thyromegaly.   Cardiovascular:      Rate and Rhythm: Normal rate and regular rhythm.      Heart sounds: Normal heart sounds. No murmur heard.   No friction rub. No gallop.    Pulmonary:      Effort: Pulmonary effort is normal. No respiratory distress.      Breath sounds: Normal breath sounds. No wheezing or rales.   Abdominal:      General: Bowel sounds are normal. There is no distension.      Palpations: Abdomen is soft. There is no mass.      Tenderness: There is no abdominal tenderness. There is no guarding or rebound.   Musculoskeletal:         General: No deformity. Normal range of motion.      Cervical back: Normal range of motion and neck supple.   Lymphadenopathy:     "  Cervical: No cervical adenopathy.   Skin:     General: Skin is warm and dry.      Coloration: Skin is not pale.      Findings: No erythema or rash.   Neurological:      Mental Status: She is alert and oriented to person, place, and time.      Coordination: Coordination normal.      Deep Tendon Reflexes: Reflexes normal.   Psychiatric:         Behavior: Behavior normal.         Thought Content: Thought content normal.         Judgment: Judgment normal.           Assessment/Plan   Diagnoses and all orders for this visit:    1. Routine general medical examination at a health care facility ([facility])  Regular exercise/healthy diet. BSE q month. Sunscreen use encouraged. calcium intake reviewed. Check fasting labs  Lilian due 9/21- she will schedule  Colon just done this month w/ Dr Quach - 2 polyps- repeat again in 6m  DT due 8/28  covid vaccine - she had  DEXA due now- we will scheulde  Shingles- she had shingrix  paps through GYN (Izabella Bob)  -     CBC (No Diff); Future  -     TSH Rfx On Abnormal To Free T4; Future  -     Lipid Panel; Future  -     Comprehensive Metabolic Panel; Future  -     Vitamin D 25 Hydroxy; Future  -     POC Urinalysis Dipstick, Automated    2. Vitamin D deficiency- better last year - we will recheck today  -     Vitamin D 25 Hydroxy; Future    3. Irritable bowel syndrome with diarrhea- better last few weeks. She is on culturelle and fiber. I gave her order for pelvic floor therapy as well to see if it would  Help any w/ the fecal incontinence    4. Situational anxiety- uses xanax just w/ travel. Refilled today. Pt has already signed controlled substance contract. Cameron done today and appropriate.  -     ALPRAZolam (Xanax) 0.25 MG tablet; 1/2-1 qd as needed for anxiety  Dispense: 30 tablet; Refill: 2    5. Screening for osteoporosis  -     DEXA Bone Density Axial; Future

## 2021-08-18 ENCOUNTER — TRANSCRIBE ORDERS (OUTPATIENT)
Dept: ADMINISTRATIVE | Facility: HOSPITAL | Age: 61
End: 2021-08-18

## 2021-08-18 ENCOUNTER — OFFICE VISIT (OUTPATIENT)
Dept: INTERNAL MEDICINE | Facility: CLINIC | Age: 61
End: 2021-08-18

## 2021-08-18 ENCOUNTER — LAB (OUTPATIENT)
Dept: LAB | Facility: HOSPITAL | Age: 61
End: 2021-08-18

## 2021-08-18 VITALS
HEART RATE: 70 BPM | TEMPERATURE: 97.5 F | SYSTOLIC BLOOD PRESSURE: 118 MMHG | HEIGHT: 62 IN | OXYGEN SATURATION: 100 % | DIASTOLIC BLOOD PRESSURE: 66 MMHG | WEIGHT: 128.8 LBS | BODY MASS INDEX: 23.7 KG/M2

## 2021-08-18 DIAGNOSIS — K58.0 IRRITABLE BOWEL SYNDROME WITH DIARRHEA: ICD-10-CM

## 2021-08-18 DIAGNOSIS — Z00.00 ROUTINE GENERAL MEDICAL EXAMINATION AT A HEALTH CARE FACILITY: Primary | ICD-10-CM

## 2021-08-18 DIAGNOSIS — E55.9 VITAMIN D DEFICIENCY: ICD-10-CM

## 2021-08-18 DIAGNOSIS — F41.8 SITUATIONAL ANXIETY: ICD-10-CM

## 2021-08-18 DIAGNOSIS — Z13.820 SCREENING FOR OSTEOPOROSIS: ICD-10-CM

## 2021-08-18 DIAGNOSIS — Z12.31 VISIT FOR SCREENING MAMMOGRAM: Primary | ICD-10-CM

## 2021-08-18 DIAGNOSIS — Z00.00 ROUTINE GENERAL MEDICAL EXAMINATION AT A HEALTH CARE FACILITY: ICD-10-CM

## 2021-08-18 LAB
25(OH)D3 SERPL-MCNC: 34.4 NG/ML
ALBUMIN SERPL-MCNC: 4.1 G/DL (ref 3.5–5.2)
ALBUMIN/GLOB SERPL: 1.5 G/DL
ALP SERPL-CCNC: 64 U/L (ref 39–117)
ALT SERPL W P-5'-P-CCNC: 12 U/L (ref 1–33)
ANION GAP SERPL CALCULATED.3IONS-SCNC: 8.6 MMOL/L (ref 5–15)
AST SERPL-CCNC: 18 U/L (ref 1–32)
BILIRUB BLD-MCNC: NEGATIVE MG/DL
BILIRUB SERPL-MCNC: 0.3 MG/DL (ref 0–1.2)
BUN SERPL-MCNC: 8 MG/DL (ref 8–23)
BUN/CREAT SERPL: 13.6 (ref 7–25)
CALCIUM SPEC-SCNC: 9.3 MG/DL (ref 8.6–10.5)
CHLORIDE SERPL-SCNC: 105 MMOL/L (ref 98–107)
CHOLEST SERPL-MCNC: 226 MG/DL (ref 0–200)
CLARITY, POC: CLEAR
CO2 SERPL-SCNC: 26.4 MMOL/L (ref 22–29)
COLOR UR: YELLOW
CREAT SERPL-MCNC: 0.59 MG/DL (ref 0.57–1)
DEPRECATED RDW RBC AUTO: 41.7 FL (ref 37–54)
ERYTHROCYTE [DISTWIDTH] IN BLOOD BY AUTOMATED COUNT: 12.4 % (ref 12.3–15.4)
GFR SERPL CREATININE-BSD FRML MDRD: 104 ML/MIN/1.73
GLOBULIN UR ELPH-MCNC: 2.7 GM/DL
GLUCOSE SERPL-MCNC: 88 MG/DL (ref 65–99)
GLUCOSE UR STRIP-MCNC: NEGATIVE MG/DL
HCT VFR BLD AUTO: 39.1 % (ref 34–46.6)
HDLC SERPL-MCNC: 86 MG/DL (ref 40–60)
HGB BLD-MCNC: 13.4 G/DL (ref 12–15.9)
KETONES UR QL: NEGATIVE
LDLC SERPL CALC-MCNC: 133 MG/DL (ref 0–100)
LDLC/HDLC SERPL: 1.53 {RATIO}
LEUKOCYTE EST, POC: NEGATIVE
MCH RBC QN AUTO: 32 PG (ref 26.6–33)
MCHC RBC AUTO-ENTMCNC: 34.3 G/DL (ref 31.5–35.7)
MCV RBC AUTO: 93.3 FL (ref 79–97)
NITRITE UR-MCNC: NEGATIVE MG/ML
PH UR: 6 [PH] (ref 5–8)
PLATELET # BLD AUTO: 300 10*3/MM3 (ref 140–450)
PMV BLD AUTO: 10.2 FL (ref 6–12)
POTASSIUM SERPL-SCNC: 4.5 MMOL/L (ref 3.5–5.2)
PROT SERPL-MCNC: 6.8 G/DL (ref 6–8.5)
PROT UR STRIP-MCNC: NEGATIVE MG/DL
RBC # BLD AUTO: 4.19 10*6/MM3 (ref 3.77–5.28)
RBC # UR STRIP: NEGATIVE /UL
SODIUM SERPL-SCNC: 140 MMOL/L (ref 136–145)
SP GR UR: 1.01 (ref 1–1.03)
TRIGL SERPL-MCNC: 42 MG/DL (ref 0–150)
TSH SERPL DL<=0.05 MIU/L-ACNC: 1.44 UIU/ML (ref 0.27–4.2)
UROBILINOGEN UR QL: NORMAL
VLDLC SERPL-MCNC: 7 MG/DL (ref 5–40)
WBC # BLD AUTO: 4.69 10*3/MM3 (ref 3.4–10.8)

## 2021-08-18 PROCEDURE — 82306 VITAMIN D 25 HYDROXY: CPT | Performed by: INTERNAL MEDICINE

## 2021-08-18 PROCEDURE — 81003 URINALYSIS AUTO W/O SCOPE: CPT | Performed by: INTERNAL MEDICINE

## 2021-08-18 PROCEDURE — 80053 COMPREHEN METABOLIC PANEL: CPT | Performed by: INTERNAL MEDICINE

## 2021-08-18 PROCEDURE — 84443 ASSAY THYROID STIM HORMONE: CPT | Performed by: INTERNAL MEDICINE

## 2021-08-18 PROCEDURE — 99396 PREV VISIT EST AGE 40-64: CPT | Performed by: INTERNAL MEDICINE

## 2021-08-18 PROCEDURE — 85027 COMPLETE CBC AUTOMATED: CPT | Performed by: INTERNAL MEDICINE

## 2021-08-18 PROCEDURE — 80061 LIPID PANEL: CPT | Performed by: INTERNAL MEDICINE

## 2021-08-18 RX ORDER — ALPRAZOLAM 0.25 MG/1
TABLET ORAL
Qty: 30 TABLET | Refills: 2 | Status: SHIPPED | OUTPATIENT
Start: 2021-08-18 | End: 2022-04-19 | Stop reason: SDUPTHER

## 2021-09-21 ENCOUNTER — HOSPITAL ENCOUNTER (OUTPATIENT)
Dept: MAMMOGRAPHY | Facility: HOSPITAL | Age: 61
Discharge: HOME | End: 2021-09-21
Admitting: INTERNAL MEDICINE

## 2021-09-21 DIAGNOSIS — Z12.31 VISIT FOR SCREENING MAMMOGRAM: ICD-10-CM

## 2021-09-21 PROCEDURE — 77063 BREAST TOMOSYNTHESIS BI: CPT

## 2021-09-21 PROCEDURE — 77067 SCR MAMMO BI INCL CAD: CPT | Performed by: RADIOLOGY

## 2021-09-21 PROCEDURE — 77067 SCR MAMMO BI INCL CAD: CPT

## 2021-09-21 PROCEDURE — 77063 BREAST TOMOSYNTHESIS BI: CPT | Performed by: RADIOLOGY

## 2021-10-20 ENCOUNTER — APPOINTMENT (OUTPATIENT)
Dept: GENERAL RADIOLOGY | Facility: HOSPITAL | Age: 61
End: 2021-10-20

## 2021-10-20 ENCOUNTER — HOSPITAL ENCOUNTER (EMERGENCY)
Facility: HOSPITAL | Age: 61
Discharge: HOME | End: 2021-10-20
Attending: EMERGENCY MEDICINE | Admitting: EMERGENCY MEDICINE

## 2021-10-20 ENCOUNTER — APPOINTMENT (OUTPATIENT)
Dept: BONE DENSITY | Facility: HOSPITAL | Age: 61
End: 2021-10-20

## 2021-10-20 VITALS
RESPIRATION RATE: 18 BRPM | SYSTOLIC BLOOD PRESSURE: 130 MMHG | HEART RATE: 78 BPM | TEMPERATURE: 98 F | WEIGHT: 123 LBS | DIASTOLIC BLOOD PRESSURE: 89 MMHG | BODY MASS INDEX: 22.63 KG/M2 | HEIGHT: 62 IN | OXYGEN SATURATION: 100 %

## 2021-10-20 DIAGNOSIS — S62.102A CLOSED FRACTURE OF LEFT WRIST, INITIAL ENCOUNTER: Primary | ICD-10-CM

## 2021-10-20 PROCEDURE — 73130 X-RAY EXAM OF HAND: CPT

## 2021-10-20 PROCEDURE — 99283 EMERGENCY DEPT VISIT LOW MDM: CPT

## 2021-10-20 PROCEDURE — 73110 X-RAY EXAM OF WRIST: CPT

## 2021-10-20 RX ORDER — HYDROCODONE BITARTRATE AND ACETAMINOPHEN 5; 325 MG/1; MG/1
1 TABLET ORAL EVERY 6 HOURS PRN
Qty: 12 TABLET | Refills: 0 | Status: SHIPPED | OUTPATIENT
Start: 2021-10-20 | End: 2021-11-11

## 2021-10-20 NOTE — ED PROVIDER NOTES
Subjective   61-year-old female presents for evaluation of left wrist pain.  She states that just prior to coming to the emergency department this morning, she was running outside when she tripped and fell on a walnut and fell awkwardly on her left hand.  She is complaining of pain to her left thumb and wrist region since the time of the fall.  She also suffered a small abrasion to the palmar aspect of her left thumb from the fall.  No LOC.  No neck pain.  No paresthesias.  She is left-handed.  Pain is currently 8 out of 10 in severity.  Pain is worse with attempted movement.          Review of Systems   Musculoskeletal:        Left wrist pain   Skin:        Thumb abrasion   All other systems reviewed and are negative.      Past Medical History:   Diagnosis Date   • Colon polyps    • Ganglion cyst     on thumb   • H/O bone density study 04/26/2016   • H/O mammogram 09/16/2020, 04/15/2019   • IBS (irritable bowel syndrome)    • Pap smear for cervical cancer screening 02/2020    Dr. Bob       No Known Allergies    Past Surgical History:   Procedure Laterality Date   • BLADDER SURGERY  2002     bladder tack - Dr Bob   • COLONOSCOPY  2012    neg repeat in 5 years (Philomena) previous polyps   • COLONOSCOPY W/ POLYPECTOMY  08/11/2021    Dr. Quach, will repeat in 6 months       Family History   Problem Relation Age of Onset   • Atrial fibrillation Mother    • Heart failure Mother         chronic   • Other Mother         hyponatremia   • Hypothyroidism Mother    • Hyperlipidemia Mother    • Anxiety disorder Mother    • COPD Mother    • Hyperlipidemia Father    • Coronary artery disease Father    • Hypertension Father    • Prostate cancer Father    • Colon cancer Maternal Aunt    • Colon cancer Maternal Aunt    • Breast cancer Neg Hx    • Ovarian cancer Neg Hx        Social History     Socioeconomic History   • Marital status:    Tobacco Use   • Smoking status: Former Smoker     Packs/day: 1.00     Years: 20.00      Pack years: 20.00     Types: Cigarettes     Quit date: 2001     Years since quittin.8   • Tobacco comment: quit age 41 1ppd x 20 yrs   Substance and Sexual Activity   • Alcohol use: Yes     Comment: wine 1/night   • Drug use: No           Objective   Physical Exam  Vitals and nursing note reviewed.   Constitutional:       General: She is not in acute distress.     Appearance: She is well-developed. She is not diaphoretic.      Comments: Nontoxic-appearing female   HENT:      Head: Normocephalic and atraumatic.   Eyes:      Pupils: Pupils are equal, round, and reactive to light.   Neck:      Comments: No midline cervical spine tenderness noted, no step-off or deformity present  Cardiovascular:      Rate and Rhythm: Normal rate and regular rhythm.      Heart sounds: Normal heart sounds. No murmur heard.  No friction rub. No gallop.    Pulmonary:      Effort: Pulmonary effort is normal. No respiratory distress.      Breath sounds: Normal breath sounds. No wheezing or rales.   Abdominal:      General: Bowel sounds are normal. There is no distension.      Palpations: Abdomen is soft. There is no mass.      Tenderness: There is no abdominal tenderness. There is no guarding or rebound.   Musculoskeletal:      Comments: Range of motion of left wrist limited secondary to pain, soft tissue swelling noted to medial dorsal aspect of left wrist region, positive snuffbox tenderness noted as well   Skin:     Comments: Small abrasion noted to palmar aspect of left thumb, no active bleeding   Neurological:      General: No focal deficit present.      Mental Status: She is alert and oriented to person, place, and time.      Comments: Left upper extremity is neurovascularly intact distally with bounding distal pulses and normal sensation   Psychiatric:         Mood and Affect: Mood normal.         Thought Content: Thought content normal.         Judgment: Judgment normal.         Splint - Cast - Strapping    Date/Time:  10/20/2021 4:25 PM  Performed by: Felipe Montoya MD  Authorized by: Felipe Montoya MD     Consent:     Consent obtained:  Verbal    Consent given by:  Patient    Risks discussed:  Discoloration, numbness, pain and swelling    Alternatives discussed:  Alternative treatment  Pre-procedure details:     Sensation:  Normal  Procedure details:     Laterality:  Left    Location:  Wrist    Wrist:  L wrist    Strapping: no      Splint type:  Sugar tong    Supplies:  Cotton padding and Ortho-Glass  Post-procedure details:     Pain:  Unchanged    Sensation:  Normal    Patient tolerance of procedure:  Tolerated well, no immediate complications               ED Course  ED Course as of 10/20/21 1059   Wed Oct 20, 2021   0929 61-year-old female presents for evaluation of left wrist and thumb pain following a mechanical trip and fall while running this morning.  She is complaining of isolated pain to her left hand/wrist region.  On arrival, the patient is nontoxic-appearing.  No LOC.  No neck pain.  NEXUS negative.  She is left-handed.  Left upper extremity is neurovascularly intact distally with bounding distal pulses and normal sensation.  Tetanus is up-to-date.  We will obtain plain films, and we will reassess following initial interventions. [DD]   1058 I personally viewed the patient's x-ray images myself, and I am in agreement with the radiologist's reading for final interpretation.     [DD]   1058 Sugar tong splint placed without complication.  Patient neurovascularly intact following splint placement.  She was referred to Dr. Islas of orthopedics and will follow-up within the next 72 hours.  Agreeable with plan and given appropriate strict return precautions. [DD]      ED Course User Index  [DD] Felipe Montoya MD                                 No results found for this or any previous visit (from the past 24 hour(s)).  Note: In addition to lab results from this visit, the labs listed above may include labs  "taken at another facility or during a different encounter within the last 24 hours. Please correlate lab times with ED admission and discharge times for further clarification of the services performed during this visit.    XR Hand 3+ View Left   Preliminary Result   Comminuted fracture involving the distal radius and ulnar   styloid process. Degenerative changes seen within the hand.       D: 10/20/2021   E: 10/20/2021          XR Wrist 3+ View Left   Preliminary Result   Comminuted fracture involving the distal radius and ulnar   styloid process. Degenerative changes seen within the hand.       D: 10/20/2021   E: 10/20/2021            Vitals:    10/20/21 0924   BP: 130/89   BP Location: Right arm   Patient Position: Sitting   Pulse: 78   Resp: 18   Temp: 98 °F (36.7 °C)   TempSrc: Oral   SpO2: 100%   Weight: 55.8 kg (123 lb)   Height: 157.5 cm (62\")     Medications - No data to display  ECG/EMG Results (last 24 hours)     ** No results found for the last 24 hours. **        No orders to display                 MDM    Final diagnoses:   Closed fracture of left wrist, initial encounter       ED Disposition  ED Disposition     ED Disposition Condition Comment    Discharge Stable           Sandoval Islas MD  [address]  [address]  [place] [address]  [phone]    In 3 days           Medication List      New Prescriptions    HYDROcodone-acetaminophen 5-325 MG per tablet  Commonly known as: NORCO  Take 1 tablet by mouth Every 6 (Six) Hours As Needed for Moderate Pain .           Where to Get Your Medications      [facility] [facility] - [phone]  - [phone]   [address] [place] [address]    Phone: [phone]   · HYDROcodone-acetaminophen 5-325 MG per tablet          Felipe Montoya MD  10/20/21 1626    "

## 2021-10-21 ENCOUNTER — OFFICE VISIT (OUTPATIENT)
Dept: ORTHOPEDIC SURGERY | Facility: CLINIC | Age: 61
End: 2021-10-21

## 2021-10-21 ENCOUNTER — PREP FOR SURGERY (OUTPATIENT)
Dept: OTHER | Facility: HOSPITAL | Age: 61
End: 2021-10-21

## 2021-10-21 VITALS
WEIGHT: 123.02 LBS | BODY MASS INDEX: 22.64 KG/M2 | HEART RATE: 85 BPM | DIASTOLIC BLOOD PRESSURE: 79 MMHG | HEIGHT: 62 IN | SYSTOLIC BLOOD PRESSURE: 143 MMHG
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
## 2021-10-21 DIAGNOSIS — S52.612A CLOSED DISPLACED FRACTURE OF STYLOID PROCESS OF LEFT ULNA, INITIAL ENCOUNTER: ICD-10-CM

## 2021-10-21 DIAGNOSIS — S62.102A CLOSED FRACTURE OF LEFT WRIST, INITIAL ENCOUNTER: Primary | ICD-10-CM

## 2021-10-21 DIAGNOSIS — S52.572A OTHER CLOSED INTRA-ARTICULAR FRACTURE OF DISTAL END OF LEFT RADIUS, INITIAL ENCOUNTER: ICD-10-CM

## 2021-10-21 DIAGNOSIS — M25.532 ACUTE PAIN OF LEFT WRIST: Primary | ICD-10-CM

## 2021-10-21 PROCEDURE — 99204 OFFICE O/P NEW MOD 45 MIN: CPT | Performed by: PHYSICIAN ASSISTANT

## 2021-10-21 RX ORDER — ACETAMINOPHEN 500 MG
1000 TABLET ORAL ONCE
Status: CANCELLED | OUTPATIENT
Start: 2021-10-21 | End: 2021-10-21

## 2021-10-21 RX ORDER — CEFAZOLIN SODIUM 2 G/100ML
2 INJECTION, SOLUTION INTRAVENOUS ONCE
Status: CANCELLED | OUTPATIENT
Start: 2021-10-21 | End: 2021-10-21

## 2021-10-21 NOTE — PROGRESS NOTES
I have reviewed the notes, assessments, and/or procedures performed by Anuja Thurston PA-C, I concur with her documentation of Lila Rodriguez.      Patient seen and examined with Anuja this afternoon in the office.  Patient was running in the [place] neighborhood when she stepped on a walnut and landed directly on her left wrist.  She is right-hand dominant but writes with her left hand.  Patient was seen in the ER and requested to be seen in our office secondary to friendship with a prior patient of ours.  Review of the patient's examination shows no neurovascular deficits.  She is with her  today.  No abrasions noted.  We reviewed her x-ray which shows a comminuted four-part distal radius fracture with a split between the scaphoid and lunate facets and a large styloid fragment.  On the oblique, there is some suggestion of some depression of the articular surface.  There is dorsal comminution.  We had a lengthy discussion with the patient and her  regarding treatment options and alternatives.  At this point, we recommended ORIF of the distal radius to restore her anatomic parameters and to help achieve more predictable healing.  The risk, benefits, potential hazards, typical recovery and rehab as well as reasonable alternatives to the procedure were discussed with her this afternoon she would like to proceed with surgical correction.  We will get this done as an outpatient in the near future.

## 2021-10-21 NOTE — PROGRESS NOTES
[facility] [place] [facility] Note    Subjective     Chief Complaint   Patient presents with   • Left Wrist - Pain     DOI: 10/20/21 had a fall        HPI  Lila Rodriguez is a 61 y.o. female.  Writes with her left hand but performs other tasks with right.  New patient presents for evaluation of left wrist pain secondary to fall she sustained yesterday.  Patient states she was running tripped and fell on a walnut resulting in left wrist pain.  She was seen at the ED, placed in a sugar tong splint and referred to orthopedics for further evaluation and treatment.      Pain scale: 5/10.  Severity of the pain moderate.  Quality of the pain aching, throbbing.  Associated symptoms swelling.  Activity related to pain any attempted movement of joint.  Pain relieved by leaving it in the splint has helped.  No reported numbness or tingling.      Denies fever, chills, night sweats or other constitutional symptoms.      Past Medical History:   Diagnosis Date   • Colon polyps    • Ganglion cyst     on thumb   • H/O bone density study 04/26/2016   • H/O mammogram 09/16/2020, 04/15/2019   • IBS (irritable bowel syndrome)    • Pap smear for cervical cancer screening 02/2020    Dr. Bob      Past Surgical History:   Procedure Laterality Date   • BLADDER SURGERY  2002     bladder tack - Dr Bob   • COLONOSCOPY  2012    neg repeat in 5 years ([facility]a) previous polyps   • COLONOSCOPY W/ POLYPECTOMY  08/11/2021    Dr. Quach, will repeat in 6 months      Family History   Problem Relation Age of Onset   • Atrial fibrillation Mother    • Heart failure Mother         chronic   • Other Mother         hyponatremia   • Hypothyroidism Mother    • Hyperlipidemia Mother    • Anxiety disorder Mother    • COPD Mother    • Hyperlipidemia Father    • Coronary artery disease Father    • Hypertension Father    • Prostate cancer Father    • Colon cancer Maternal Aunt    • Colon cancer Maternal Aunt    • Breast cancer Neg Hx    • Ovarian cancer  "Neg Hx      Social History     Socioeconomic History   • Marital status:    Tobacco Use   • Smoking status: Former Smoker     Packs/day: 1.00     Years: 20.00     Pack years: 20.00     Types: Cigarettes     Quit date: 2001     Years since quittin.8   • Smokeless tobacco: Never Used   • Tobacco comment: quit age 41 1ppd x 20 yrs   Substance and Sexual Activity   • Alcohol use: Yes     Comment: wine 1/night   • Drug use: No   • Sexual activity: Defer      Current Outpatient Medications on File Prior to Visit   Medication Sig Dispense Refill   • ALPRAZolam (Xanax) 0.25 MG tablet 1/2-1 qd as needed for anxiety 30 tablet 2   • HYDROcodone-acetaminophen (NORCO) 5-325 MG per tablet Take 1 tablet by mouth Every 6 (Six) Hours As Needed for Moderate Pain . 12 tablet 0   • methylcellulose, Laxative, (Citrucel) 500 MG tablet tablet Take 4 tablets by mouth Every 4 (Four) Hours As Needed.     • TURMERIC PO Take 2 tablets by mouth Daily.       No current facility-administered medications on file prior to visit.      No Known Allergies     The following portions of the patient's history were reviewed and updated as appropriate: allergies, current medications, past family history, past medical history, past social history, past surgical history and problem list.    Review of Systems   Constitutional: Negative.    HENT: Negative.    Eyes: Negative.    Respiratory: Negative.    Cardiovascular: Negative.    Gastrointestinal: Negative.    Endocrine: Negative.    Genitourinary: Negative.    Musculoskeletal: Positive for arthralgias.   Skin: Negative.    Allergic/Immunologic: Negative.    Neurological: Negative.    Hematological: Negative.    Psychiatric/Behavioral: Negative.         Objective      Physical Exam  /79   Pulse 85   Ht 157.5 cm (62.01\")   Wt 55.8 kg (123 lb 0.3 oz)   BMI 22.49 kg/m²     Body mass index is 22.49 kg/m².    GENERAL APPEARANCE: awake, alert & oriented x 3, in no acute distress and well " developed, well nourished  PSYCH: normal mood and affect  LUNGS:  breathing nonlabored, no wheezing  EYES: sclera anicteric, pupils equal  CARDIOVASCULAR: palpable pulses. Capillary refill less than 2 seconds  INTEGUMENTARY: skin intact, no clubbing, cyanosis  NEUROLOGIC:  Normal Sensation         Ortho Exam  Left wrist/forearm  Skin: Grossly intact with any redness, warmth.  Positive soft tissue swelling noted about the wrist into hand and digits.  All compartments of the forearm are soft and compressible.  Mild ecchymosis noted volar aspect of the wrist.  No skin breakdown appreciable.  Patient had 3 rings on her ring finger that had to be cut off secondary to the swelling noted to the digits.  Tenderness: Positive tenderness noted throughout the wrist  Motion: Unable to assess secondary to pain.  Motor: Grossly intact R/U/M/AIN/PIN  Sensory: Grossly intact LT: R/U/M  Vascular: Brisk capillary refill, 2+ radial pulse    Shoulder and elbow  Skin intact  No tenderness  Full range of motion      Imaging/Studies  Dr. Arellano and I reviewed plain film imaging performed on 10/20/2021.    EXAMINATION: XR HAND 3+ VW LEFT-, XR WRIST 3+ VW LEFT- 10/20/2021     INDICATION: fall      COMPARISON: NONE     FINDINGS: 3 views of the left hand and 3 views of the left wrist reveal  comminuted fracture seen of the distal radius and ulnar styloid process.  There is extension to the articular surface of the distal radius. Carpal  bones are intact and unremarkable. Deformity of the soft tissues from  soft tissue swelling and fracture. There is extensive degenerative  changes seen at the distal interphalangeal joints of the second and  third digit. No acute bony abnormality seen within the hand.        IMPRESSION:  Comminuted fracture involving the distal radius and ulnar  styloid process. Degenerative changes seen within the hand.     D: 10/20/2021  E: 10/20/2021    Assessment/Plan        ICD-10-CM ICD-9-CM   1. Acute pain of left  wrist  M25.532 719.43   2. Other closed intra-articular fracture of distal end of left radius, initial encounter  S52.572A 813.42   3. Closed displaced fracture of styloid process of left ulna, initial encounter  S52.612A 813.43   4. Fall from slip, trip, or stumble, initial encounter  W01.0XXA E885.9       No orders of the defined types were placed in this encounter.    -Acute left wrist pain due to comminuted displaced intra-articular distal radius fracture and displaced ulnar styloid fracture.    -Rings that were present on her ring finger were cut off today.  No evidence of skin breakdown or issues after the rings were removed.  -Discussed risk, benefits and indications of operative versus nonoperative treatment.  -Patient wishes to proceed with operative intervention.  -Patient was introduced to Dr. Arellano.  -She is placed into a nonremovable fiberglass volar splint today.  -Discussed the importance of elevation to help assist with inflammation/swelling control.  -To remain nonweightbearing to the left wrist.  -Scheduled for left distal radius ORIF next Wednesday, 11/27/2021.  -Patient may continue to use narcotic pain medication supplied by ED.  Recommend transitioning to over-the-counter acetaminophen/Tylenol as able.  -Questions and concerns answered.    Patient was also examined by Dr. Arellano and he agrees with the above assessment and plan.    Indications, risks, benefits and alternatives of surgical versus continued nonsurgical treatment were discussed with the patient. Surgical risks include but are not limited to pain, bleeding, infection, failure to relieve symptoms, need for further procedures, recurrence of symptoms, damage to healthy adjacent structures, hardware loosening/failure, malunion/nonunion, stiffness, weakness, scar, DVT/PE, loss of limb or life. We also discussed the postoperative protocol and expected outcome. All questions were answered; the patient would like to proceed with  surgical intervention.       Medical Decision Making  Management Options : over-the-counter medicine, prescription/IM medicine and major surgery with risk factors  Data/Risk: radiology tests       Anuja Thurston PA-C  10/21/21  16:28 EDT               EMR Dragon/Transcription disclaimer:  Much of this encounter note is an electronic transcription of spoken language to printed text. Electronic transcription of spoken language may permit erroneous, or at times, nonsensical words or phrases to be inadvertently transcribed. Although I have reviewed the note for such errors, some may still exist.

## 2021-10-24 ENCOUNTER — APPOINTMENT (OUTPATIENT)
Dept: PREADMISSION TESTING | Facility: HOSPITAL | Age: 61
End: 2021-10-24

## 2021-10-24 DIAGNOSIS — S62.102A CLOSED FRACTURE OF LEFT WRIST, INITIAL ENCOUNTER: ICD-10-CM

## 2021-10-24 LAB — SARS-COV-2 RNA PNL SPEC NAA+PROBE: NOT DETECTED

## 2021-10-24 PROCEDURE — U0004 COV-19 TEST NON-CDC HGH THRU: HCPCS

## 2021-10-24 PROCEDURE — C9803 HOPD COVID-19 SPEC COLLECT: HCPCS

## 2021-10-26 ENCOUNTER — ANESTHESIA EVENT (OUTPATIENT)
Dept: PERIOP | Facility: HOSPITAL | Age: 61
End: 2021-10-26

## 2021-10-26 RX ORDER — FAMOTIDINE 10 MG/ML
20 INJECTION, SOLUTION INTRAVENOUS ONCE
Status: CANCELLED | OUTPATIENT
Start: 2021-10-26 | End: 2021-10-26

## 2021-10-27 ENCOUNTER — ANESTHESIA (OUTPATIENT)
Dept: PERIOP | Facility: HOSPITAL | Age: 61
End: 2021-10-27

## 2021-10-27 ENCOUNTER — HOSPITAL ENCOUNTER (OUTPATIENT)
Facility: HOSPITAL | Age: 61
Discharge: HOME | End: 2021-10-27
Attending: ORTHOPAEDIC SURGERY | Admitting: ORTHOPAEDIC SURGERY

## 2021-10-27 ENCOUNTER — ANESTHESIA EVENT CONVERTED (OUTPATIENT)
Dept: ANESTHESIOLOGY | Facility: HOSPITAL | Age: 61
End: 2021-10-27

## 2021-10-27 VITALS
TEMPERATURE: 97 F | SYSTOLIC BLOOD PRESSURE: 127 MMHG | HEART RATE: 54 BPM | HEIGHT: 62 IN | OXYGEN SATURATION: 98 % | DIASTOLIC BLOOD PRESSURE: 76 MMHG | WEIGHT: 123 LBS | RESPIRATION RATE: 15 BRPM | BODY MASS INDEX: 22.63 KG/M2

## 2021-10-27 DIAGNOSIS — S62.102A CLOSED FRACTURE OF LEFT WRIST, INITIAL ENCOUNTER: ICD-10-CM

## 2021-10-27 DIAGNOSIS — S62.102D CLOSED FRACTURE OF LEFT WRIST WITH ROUTINE HEALING, SUBSEQUENT ENCOUNTER: Primary | ICD-10-CM

## 2021-10-27 LAB — GLUCOSE BLDC GLUCOMTR-MCNC: 82 MG/DL (ref 70–130)

## 2021-10-27 PROCEDURE — C1713 ANCHOR/SCREW BN/BN,TIS/BN: HCPCS | Performed by: ORTHOPAEDIC SURGERY

## 2021-10-27 PROCEDURE — 0 CEFAZOLIN IN DEXTROSE 2-4 GM/100ML-% SOLUTION: Performed by: ORTHOPAEDIC SURGERY

## 2021-10-27 PROCEDURE — 25010000002 NEOSTIGMINE 10 MG/10ML SOLUTION: Performed by: NURSE ANESTHETIST, CERTIFIED REGISTERED

## 2021-10-27 PROCEDURE — 82962 GLUCOSE BLOOD TEST: CPT

## 2021-10-27 PROCEDURE — 25010000002 ONDANSETRON PER 1 MG: Performed by: NURSE ANESTHETIST, CERTIFIED REGISTERED

## 2021-10-27 PROCEDURE — 25609 OPTX DST RD XART FX/EP SEP3+: CPT | Performed by: ORTHOPAEDIC SURGERY

## 2021-10-27 PROCEDURE — 25010000002 FENTANYL CITRATE (PF) 50 MCG/ML SOLUTION

## 2021-10-27 PROCEDURE — 76942 ECHO GUIDE FOR BIOPSY: CPT | Performed by: ORTHOPAEDIC SURGERY

## 2021-10-27 PROCEDURE — 25010000002 ROPIVACAINE PER 1 MG: Performed by: NURSE ANESTHETIST, CERTIFIED REGISTERED

## 2021-10-27 PROCEDURE — 25010000002 FENTANYL CITRATE (PF) 50 MCG/ML SOLUTION: Performed by: NURSE ANESTHETIST, CERTIFIED REGISTERED

## 2021-10-27 PROCEDURE — 25609 OPTX DST RD XART FX/EP SEP3+: CPT | Performed by: PHYSICIAN ASSISTANT

## 2021-10-27 PROCEDURE — 25010000002 DEXAMETHASONE PER 1 MG: Performed by: NURSE ANESTHETIST, CERTIFIED REGISTERED

## 2021-10-27 PROCEDURE — 25010000002 HYDROMORPHONE PER 4 MG: Performed by: NURSE ANESTHETIST, CERTIFIED REGISTERED

## 2021-10-27 PROCEDURE — 25010000002 PROPOFOL 10 MG/ML EMULSION: Performed by: NURSE ANESTHETIST, CERTIFIED REGISTERED

## 2021-10-27 PROCEDURE — 25010000002 HYDROMORPHONE 1 MG/ML SOLUTION

## 2021-10-27 DEVICE — SCRW GEMINUS PA NL TI 3.5X10MM: Type: IMPLANTABLE DEVICE | Site: WRIST | Status: FUNCTIONAL

## 2021-10-27 DEVICE — SCRW GEMINUS PA NL TI 3.5X11MM: Type: IMPLANTABLE DEVICE | Site: WRIST | Status: FUNCTIONAL

## 2021-10-27 DEVICE — PEG GEMINUS THRD LK TI 2.3X19MM: Type: IMPLANTABLE DEVICE | Site: WRIST | Status: FUNCTIONAL

## 2021-10-27 DEVICE — PEG GEMINUS THRD LK TI 2.3X16MM: Type: IMPLANTABLE DEVICE | Site: WRIST | Status: FUNCTIONAL

## 2021-10-27 DEVICE — IMPLANTABLE DEVICE: Type: IMPLANTABLE DEVICE | Site: WRIST | Status: FUNCTIONAL

## 2021-10-27 DEVICE — PEG GEMINUS THRD LK TI 2.3X21MM: Type: IMPLANTABLE DEVICE | Site: WRIST | Status: FUNCTIONAL

## 2021-10-27 DEVICE — BONE CANC CHIPS 1/4MM 15CC: Type: IMPLANTABLE DEVICE | Site: WRIST | Status: FUNCTIONAL

## 2021-10-27 DEVICE — SCRW GEMINUS PA NL TI 3.5X12MM: Type: IMPLANTABLE DEVICE | Site: WRIST | Status: FUNCTIONAL

## 2021-10-27 DEVICE — PEG THRD GEMINUS LK TI 2.3X17MM: Type: IMPLANTABLE DEVICE | Site: WRIST | Status: FUNCTIONAL

## 2021-10-27 DEVICE — PLT DIST/RAD GEMINUS NRW 3HL LT: Type: IMPLANTABLE DEVICE | Site: WRIST | Status: FUNCTIONAL

## 2021-10-27 RX ORDER — FAMOTIDINE 20 MG/1
20 TABLET, FILM COATED ORAL ONCE
Status: DISCONTINUED | OUTPATIENT
Start: 2021-10-27 | End: 2021-10-27

## 2021-10-27 RX ORDER — FENTANYL CITRATE 50 UG/ML
INJECTION, SOLUTION INTRAMUSCULAR; INTRAVENOUS
Status: COMPLETED
Start: 2021-10-27 | End: 2021-10-27

## 2021-10-27 RX ORDER — SODIUM CHLORIDE, SODIUM LACTATE, POTASSIUM CHLORIDE, CALCIUM CHLORIDE 600; 310; 30; 20 MG/100ML; MG/100ML; MG/100ML; MG/100ML
9 INJECTION, SOLUTION INTRAVENOUS CONTINUOUS
Status: DISCONTINUED | OUTPATIENT
Start: 2021-10-27 | End: 2021-10-27 | Stop reason: HOSPADM

## 2021-10-27 RX ORDER — DEXAMETHASONE SODIUM PHOSPHATE 4 MG/ML
INJECTION, SOLUTION INTRA-ARTICULAR; INTRALESIONAL; INTRAMUSCULAR; INTRAVENOUS; SOFT TISSUE AS NEEDED
Status: DISCONTINUED | OUTPATIENT
Start: 2021-10-27 | End: 2021-10-27 | Stop reason: SURG

## 2021-10-27 RX ORDER — SODIUM CHLORIDE 0.9 % (FLUSH) 0.9 %
10 SYRINGE (ML) INJECTION EVERY 12 HOURS SCHEDULED
Status: DISCONTINUED | OUTPATIENT
Start: 2021-10-27 | End: 2021-10-27

## 2021-10-27 RX ORDER — SODIUM CHLORIDE 0.9 % (FLUSH) 0.9 %
10 SYRINGE (ML) INJECTION AS NEEDED
Status: DISCONTINUED | OUTPATIENT
Start: 2021-10-27 | End: 2021-10-27

## 2021-10-27 RX ORDER — HYDROCODONE BITARTRATE AND ACETAMINOPHEN 5; 325 MG/1; MG/1
1-2 TABLET ORAL EVERY 4 HOURS PRN
Qty: 20 TABLET | Refills: 0 | Status: SHIPPED | OUTPATIENT
Start: 2021-10-27 | End: 2021-11-11

## 2021-10-27 RX ORDER — FENTANYL CITRATE 50 UG/ML
50 INJECTION, SOLUTION INTRAMUSCULAR; INTRAVENOUS
Status: DISCONTINUED | OUTPATIENT
Start: 2021-10-27 | End: 2021-10-27 | Stop reason: HOSPADM

## 2021-10-27 RX ORDER — ROCURONIUM BROMIDE 10 MG/ML
INJECTION, SOLUTION INTRAVENOUS AS NEEDED
Status: DISCONTINUED | OUTPATIENT
Start: 2021-10-27 | End: 2021-10-27 | Stop reason: SURG

## 2021-10-27 RX ORDER — HYDROMORPHONE HYDROCHLORIDE 1 MG/ML
0.5 INJECTION, SOLUTION INTRAMUSCULAR; INTRAVENOUS; SUBCUTANEOUS
Status: DISCONTINUED | OUTPATIENT
Start: 2021-10-27 | End: 2021-10-27 | Stop reason: HOSPADM

## 2021-10-27 RX ORDER — FAMOTIDINE 20 MG/1
20 TABLET, FILM COATED ORAL ONCE
Status: COMPLETED | OUTPATIENT
Start: 2021-10-27 | End: 2021-10-27

## 2021-10-27 RX ORDER — ACETAMINOPHEN 500 MG
1000 TABLET ORAL ONCE
Status: COMPLETED | OUTPATIENT
Start: 2021-10-27 | End: 2021-10-27

## 2021-10-27 RX ORDER — MAGNESIUM HYDROXIDE 1200 MG/15ML
LIQUID ORAL AS NEEDED
Status: DISCONTINUED | OUTPATIENT
Start: 2021-10-27 | End: 2021-10-27 | Stop reason: HOSPADM

## 2021-10-27 RX ORDER — CEFAZOLIN SODIUM 2 G/100ML
2 INJECTION, SOLUTION INTRAVENOUS ONCE
Status: COMPLETED | OUTPATIENT
Start: 2021-10-27 | End: 2021-10-27

## 2021-10-27 RX ORDER — ONDANSETRON 2 MG/ML
INJECTION INTRAMUSCULAR; INTRAVENOUS AS NEEDED
Status: DISCONTINUED | OUTPATIENT
Start: 2021-10-27 | End: 2021-10-27 | Stop reason: SURG

## 2021-10-27 RX ORDER — BUPIVACAINE HYDROCHLORIDE 2.5 MG/ML
INJECTION, SOLUTION EPIDURAL; INFILTRATION; INTRACAUDAL
Status: COMPLETED | OUTPATIENT
Start: 2021-10-27 | End: 2021-10-27

## 2021-10-27 RX ORDER — HYDROCODONE BITARTRATE AND ACETAMINOPHEN 5; 325 MG/1; MG/1
1 TABLET ORAL ONCE AS NEEDED
Status: DISCONTINUED | OUTPATIENT
Start: 2021-10-27 | End: 2021-10-27 | Stop reason: HOSPADM

## 2021-10-27 RX ORDER — LIDOCAINE HYDROCHLORIDE 10 MG/ML
INJECTION, SOLUTION EPIDURAL; INFILTRATION; INTRACAUDAL; PERINEURAL AS NEEDED
Status: DISCONTINUED | OUTPATIENT
Start: 2021-10-27 | End: 2021-10-27 | Stop reason: SURG

## 2021-10-27 RX ORDER — MIDAZOLAM HYDROCHLORIDE 1 MG/ML
1 INJECTION INTRAMUSCULAR; INTRAVENOUS
Status: DISCONTINUED | OUTPATIENT
Start: 2021-10-27 | End: 2021-10-27 | Stop reason: HOSPADM

## 2021-10-27 RX ORDER — HYDROCODONE BITARTRATE AND ACETAMINOPHEN 5; 325 MG/1; MG/1
TABLET ORAL
Status: COMPLETED
Start: 2021-10-27 | End: 2021-10-27

## 2021-10-27 RX ORDER — PROPOFOL 10 MG/ML
VIAL (ML) INTRAVENOUS AS NEEDED
Status: DISCONTINUED | OUTPATIENT
Start: 2021-10-27 | End: 2021-10-27 | Stop reason: SURG

## 2021-10-27 RX ORDER — LIDOCAINE HYDROCHLORIDE 10 MG/ML
0.5 INJECTION, SOLUTION EPIDURAL; INFILTRATION; INTRACAUDAL; PERINEURAL ONCE AS NEEDED
Status: DISCONTINUED | OUTPATIENT
Start: 2021-10-27 | End: 2021-10-27

## 2021-10-27 RX ORDER — NEOSTIGMINE METHYLSULFATE 1 MG/ML
INJECTION, SOLUTION INTRAVENOUS AS NEEDED
Status: DISCONTINUED | OUTPATIENT
Start: 2021-10-27 | End: 2021-10-27 | Stop reason: SURG

## 2021-10-27 RX ORDER — SODIUM CHLORIDE 0.9 % (FLUSH) 0.9 %
10 SYRINGE (ML) INJECTION EVERY 12 HOURS SCHEDULED
Status: DISCONTINUED | OUTPATIENT
Start: 2021-10-27 | End: 2021-10-27 | Stop reason: HOSPADM

## 2021-10-27 RX ORDER — SODIUM CHLORIDE 0.9 % (FLUSH) 0.9 %
10 SYRINGE (ML) INJECTION AS NEEDED
Status: DISCONTINUED | OUTPATIENT
Start: 2021-10-27 | End: 2021-10-27 | Stop reason: HOSPADM

## 2021-10-27 RX ORDER — FAMOTIDINE 10 MG/ML
20 INJECTION, SOLUTION INTRAVENOUS ONCE
Status: DISCONTINUED | OUTPATIENT
Start: 2021-10-27 | End: 2021-10-27

## 2021-10-27 RX ORDER — LIDOCAINE HYDROCHLORIDE 10 MG/ML
0.5 INJECTION, SOLUTION EPIDURAL; INFILTRATION; INTRACAUDAL; PERINEURAL ONCE AS NEEDED
Status: COMPLETED | OUTPATIENT
Start: 2021-10-27 | End: 2021-10-27

## 2021-10-27 RX ORDER — SODIUM CHLORIDE, SODIUM LACTATE, POTASSIUM CHLORIDE, CALCIUM CHLORIDE 600; 310; 30; 20 MG/100ML; MG/100ML; MG/100ML; MG/100ML
9 INJECTION, SOLUTION INTRAVENOUS CONTINUOUS
Status: DISCONTINUED | OUTPATIENT
Start: 2021-10-27 | End: 2021-10-27

## 2021-10-27 RX ORDER — MIDAZOLAM HYDROCHLORIDE 1 MG/ML
1 INJECTION INTRAMUSCULAR; INTRAVENOUS
Status: DISCONTINUED | OUTPATIENT
Start: 2021-10-27 | End: 2021-10-27

## 2021-10-27 RX ORDER — GLYCOPYRROLATE 0.2 MG/ML
INJECTION INTRAMUSCULAR; INTRAVENOUS AS NEEDED
Status: DISCONTINUED | OUTPATIENT
Start: 2021-10-27 | End: 2021-10-27 | Stop reason: SURG

## 2021-10-27 RX ORDER — FENTANYL CITRATE 50 UG/ML
INJECTION, SOLUTION INTRAMUSCULAR; INTRAVENOUS
Status: COMPLETED | OUTPATIENT
Start: 2021-10-27 | End: 2021-10-27

## 2021-10-27 RX ADMIN — LIDOCAINE HYDROCHLORIDE 0.2 ML: 10 INJECTION, SOLUTION EPIDURAL; INFILTRATION; INTRACAUDAL; PERINEURAL at 06:01

## 2021-10-27 RX ADMIN — ROPIVACAINE HYDROCHLORIDE 6 ML/HR: 2 INJECTION, SOLUTION EPIDURAL; INFILTRATION at 09:39

## 2021-10-27 RX ADMIN — BUPIVACAINE HYDROCHLORIDE 10 ML: 2.5 INJECTION, SOLUTION EPIDURAL; INFILTRATION; INTRACAUDAL at 07:10

## 2021-10-27 RX ADMIN — PROPOFOL 150 MG: 10 INJECTION, EMULSION INTRAVENOUS at 07:30

## 2021-10-27 RX ADMIN — NEOSTIGMINE METHYLSULFATE 3 MG: 0.5 INJECTION INTRAVENOUS at 09:24

## 2021-10-27 RX ADMIN — ACETAMINOPHEN 1000 MG: 500 TABLET ORAL at 06:23

## 2021-10-27 RX ADMIN — GLYCOPYRROLATE 0.4 MG: 0.2 INJECTION INTRAMUSCULAR; INTRAVENOUS at 09:24

## 2021-10-27 RX ADMIN — DEXAMETHASONE SODIUM PHOSPHATE 8 MG: 4 INJECTION, SOLUTION INTRA-ARTICULAR; INTRALESIONAL; INTRAMUSCULAR; INTRAVENOUS; SOFT TISSUE at 07:30

## 2021-10-27 RX ADMIN — HYDROMORPHONE HYDROCHLORIDE 0.5 MG: 1 INJECTION, SOLUTION INTRAMUSCULAR; INTRAVENOUS; SUBCUTANEOUS at 09:59

## 2021-10-27 RX ADMIN — SODIUM CHLORIDE, POTASSIUM CHLORIDE, SODIUM LACTATE AND CALCIUM CHLORIDE 9 ML/HR: 600; 310; 30; 20 INJECTION, SOLUTION INTRAVENOUS at 06:01

## 2021-10-27 RX ADMIN — CEFAZOLIN SODIUM 2 G: 2 INJECTION, SOLUTION INTRAVENOUS at 07:24

## 2021-10-27 RX ADMIN — FENTANYL CITRATE 100 MCG: 50 INJECTION, SOLUTION INTRAMUSCULAR; INTRAVENOUS at 07:10

## 2021-10-27 RX ADMIN — FENTANYL CITRATE 50 MCG: 50 INJECTION, SOLUTION INTRAMUSCULAR; INTRAVENOUS at 10:28

## 2021-10-27 RX ADMIN — ONDANSETRON 4 MG: 2 INJECTION INTRAMUSCULAR; INTRAVENOUS at 09:24

## 2021-10-27 RX ADMIN — ROCURONIUM BROMIDE 40 MG: 10 INJECTION, SOLUTION INTRAVENOUS at 07:30

## 2021-10-27 RX ADMIN — FAMOTIDINE 20 MG: 20 TABLET ORAL at 06:23

## 2021-10-27 RX ADMIN — HYDROCODONE BITARTRATE AND ACETAMINOPHEN 1 TABLET: 5; 325 TABLET ORAL at 10:16

## 2021-10-27 RX ADMIN — HYDROMORPHONE HYDROCHLORIDE 0.5 MG: 1 INJECTION, SOLUTION INTRAMUSCULAR; INTRAVENOUS; SUBCUTANEOUS at 10:10

## 2021-10-27 RX ADMIN — LIDOCAINE HYDROCHLORIDE 50 MG: 10 INJECTION, SOLUTION EPIDURAL; INFILTRATION; INTRACAUDAL; PERINEURAL at 07:30

## 2021-10-27 NOTE — ANESTHESIA PROCEDURE NOTES
Peripheral Block      Patient reassessed immediately prior to procedure    Patient location during procedure: pre-op  Reason for block: at surgeon's request and post-op pain management  Performed by  Anesthesiologist: Reinier Seymour MD  Preanesthetic Checklist  Completed: patient identified, site marked, risks and benefits discussed, surgical consent, monitors and equipment checked, pre-op evaluation and timeout performed  Prep:  Sterile barriers:cap, gloves, mask and sterile barriers  Prep: ChloraPrep  Patient monitoring: blood pressure monitoring, continuous pulse oximetry and EKG  Procedure    Sedation: yes    Guidance:ultrasound guided  Images:still images obtained, printed/placed on chart    Laterality:left  Block Type:infraclavicular  Injection Technique:catheter  Needle Type:Tuohy and echogenic  Needle Gauge:18 G  Resistance on Injection: none  Catheter Size:20 G  Cath Depth at skin: 5 cm    Medications Used: fentaNYL citrate (PF) (SUBLIMAZE) injection, 100 mcg  bupivacaine PF (MARCAINE) 0.25 % injection, 10 mL      Post Assessment  Injection Assessment: negative aspiration for heme, no paresthesia on injection and incremental injection  Patient Tolerance:comfortable throughout block  Complications:no  Additional Notes  Procedure:                                                 The affected upper extremity was adducted within the patients ROM.  The US probe was placed approximately at the distal inferior third of the clavicle in a cephalad to caudad orientation.  The brachial plexus, subclavian artery and vein where visualized and the patient was marked and sterile prep and drape where completed.  The pt was anesthetized with  IV Sedation( see meds).  Skin and cutaneous tissue was infiltrated and anesthetized with 1% Lidocaine 3 mls via a 25g needle.   A 4 inch B-Mcgarry echogenic Touhy 360 degree needle was placed inferiorly to the clavicle in a caudad direction, in plane US technique.  The needle was  visualized as it passed through Pectoralis Major and to the posterior aspect of the artery where LA was placed and spread was visualized. Injection of LA was incremental, and negative aspiration every 5 MLs.  Injection pressure was also noted as minimal and patient denied pain on injection.   A 20 gauge catheter was then placed through the needle and positioned and location was confirmed with injection of LA.  The catheter was coiled and  insertion site was sealed with exofin tissue adhesive, benzoin and steristrips .  A sterile CHG Tegaderm was placed over the site and the appropriate labels place and catheter capped.  Thank you

## 2021-10-27 NOTE — ANESTHESIA PROCEDURE NOTES
Airway  Urgency: elective    Date/Time: 10/27/2021 7:31 AM  Airway not difficult    General Information and Staff    Patient location during procedure: OR  CRNA: Ra Potter CRNA    Indications and Patient Condition  Indications for airway management: airway protection    Preoxygenated: yes  MILS not maintained throughout  Mask difficulty assessment: 1 - vent by mask    Final Airway Details  Final airway type: endotracheal airway      Successful airway: ETT  Cuffed: yes   Successful intubation technique: direct laryngoscopy  Endotracheal tube insertion site: oral  Blade: Scott  Blade size: 3  ETT size (mm): 7.0  Cormack-Lehane Classification: grade I - full view of glottis  Placement verified by: chest auscultation and capnometry   Measured from: lips  ETT/EBT  to lips (cm): 20  Number of attempts at approach: 1  Assessment: lips, teeth, and gum same as pre-op and atraumatic intubation    Additional Comments  Negative epigastric sounds, Breath sound equal bilaterally with symmetric chest rise and fall

## 2021-10-27 NOTE — ANESTHESIA PREPROCEDURE EVALUATION
Anesthesia Evaluation     Patient summary reviewed and Nursing notes reviewed                Airway   Mallampati: II  TM distance: >3 FB  Neck ROM: full  No difficulty expected  Dental - normal exam     Pulmonary - normal exam   Cardiovascular - negative cardio ROS and normal exam        Neuro/Psych- negative ROS  GI/Hepatic/Renal/Endo - negative ROS     Musculoskeletal (-) negative ROS    Abdominal  - normal exam    Bowel sounds: normal.   Substance History - negative use     OB/GYN negative ob/gyn ROS         Other                      Anesthesia Plan    ASA 1     general   (Peripheral nerve block + cath for post op pain relief)  intravenous induction     Anesthetic plan, all risks, benefits, and alternatives have been provided, discussed and informed consent has been obtained with: patient.    Plan discussed with CRNA.

## 2021-10-27 NOTE — ANESTHESIA POSTPROCEDURE EVALUATION
"Patient: Lila Rodriguez    Procedure Summary     Date: 10/27/21 Room / [facility]:  [facility]    Anesthesia Start: 0726 Anesthesia Stop:     Procedure: OPEN REDUCTION INTERNAL FIXATION DISTAL RADIUS LEFT (Left Hand) Diagnosis:       Closed fracture of left wrist, initial encounter      (Closed fracture of left wrist, initial encounter [S62.102A])    Surgeons: Finn Arellano MD Provider: Reinier Seymour MD    Anesthesia Type: general ASA Status: 1          Anesthesia Type: general    Vitals  Vitals Value Taken Time   BP     Temp     Pulse     Resp     SpO2 100 % 10/27/21 0944   Vitals shown include unvalidated device data.        Post Anesthesia Care and Evaluation    Patient location during evaluation: PACU  Patient participation: complete - patient participated  Level of consciousness: awake and responsive to verbal stimuli  Pain score: 2  Pain management: adequate  Airway patency: patent  Anesthetic complications: No anesthetic complications    Cardiovascular status: acceptable  Respiratory status: acceptable  Hydration status: acceptable    Comments: Pt awake and responsive. SV. VSS. Report to RN. Patient Vitals in the past 24 hrs:  10/27/21 0606, BP:135/86, Temp:97.7 °F (36.5 °C), Temp src:Temporal, Pulse:68, Resp:16, SpO2:99 %, Height:157.5 cm (62\"), Weight:55.8 kg (123 lb)  133/78. p 72. r 16. t 98.1                  "

## 2021-11-11 ENCOUNTER — OFFICE VISIT (OUTPATIENT)
Dept: ORTHOPEDIC SURGERY | Facility: CLINIC | Age: 61
End: 2021-11-11

## 2021-11-11 VITALS — TEMPERATURE: 97.5 F

## 2021-11-11 DIAGNOSIS — Z98.890 STATUS POST OPEN REDUCTION AND INTERNAL FIXATION (ORIF) OF FRACTURE: Primary | ICD-10-CM

## 2021-11-11 DIAGNOSIS — Z87.81 STATUS POST OPEN REDUCTION AND INTERNAL FIXATION (ORIF) OF FRACTURE: Primary | ICD-10-CM

## 2021-11-11 DIAGNOSIS — S52.612D CLOSED DISPLACED FRACTURE OF STYLOID PROCESS OF LEFT ULNA WITH ROUTINE HEALING, SUBSEQUENT ENCOUNTER: ICD-10-CM

## 2021-11-11 DIAGNOSIS — S52.572D OTHER CLOSED INTRA-ARTICULAR FRACTURE OF DISTAL END OF LEFT RADIUS WITH ROUTINE HEALING, SUBSEQUENT ENCOUNTER: ICD-10-CM

## 2021-11-11 PROCEDURE — 99024 POSTOP FOLLOW-UP VISIT: CPT | Performed by: PHYSICIAN ASSISTANT

## 2021-11-11 NOTE — PROGRESS NOTES
[facility] [place] [facility]        Subjective     Post-op (2 weeks s/p OPEN REDUCTION INTERNAL FIXATION DISTAL RADIUS LEFT 10/27/21)       HPI    Lila Rodriguez is a 61 y.o. female. Patient presents for their initial postop visit following left wrist/radius ORIF performed on the above date by Dr. Arellano.    Pain scale: 2/10. Denies numbness or tingling.  Has remained in post op splint since surgery.    Patient denies any fever, chills, night sweats or other constitutional symptoms.           Objective      Physical Exam  Temp 97.5 °F (36.4 °C)     There is no height or weight on file to calculate BMI.        Ortho Exam  Left wrist  Post op splint removed.  Skin: Incision healing well with sutures in place, no redness, warmth.    Tenderness: Positive tenderness noted throughout the wrist  Motion: No assessed at wrist. Able to make limited, loose composite fist.  Motor: Grossly intact R/U/M/AIN/PIN  Sensory: Grossly intact LT: R/U/M  Vascular: Brisk capillary refill, 2+ radial pulse      Imaging Reviewed:  Ordered left wrist plain films.  Imaging read/interpreted by Dr. Arellano.    Left Wrist X-Ray     Indication: s/p ORIF     Views:  AP, Lateral, and Oblique      Comparison: Left wrist 10/20/2021     Findings:  Patient is status post ORIF of the distal radius  The fracture appears to be healing appropriately.  New bone is visualized.  The hardware is intact.    Normal soft tissues  Normal joint spaces  Alignment appears acceptable.        Impression:  Fracture of the left distal radius s/p ORIF with interval but incomplete healing.       Assessment:  1. Status post open reduction and internal fixation (ORIF) of fracture    2. Other closed intra-articular fracture of distal end of left radius with routine healing, subsequent encounter    3. Closed displaced fracture of styloid process of left ulna with routine healing, subsequent encounter        Plan:  1. Status post ORIF left distal radius and  ulnar styloid fracture, stable and healing.  2. Sutures were removed today.  3. Placed into an Exos brace today. To wear as cast and remove only for hygiene.  4. Only gentle ROM digits, not wrist.  5. Continue OTC pain medication.  6. Follow up 11/23/2021 for repeat evaluation to include imaging.  Anticipate starting formal OT/PT after that appointment.  Provided referral to Counts include 234 beds at the [facility].  7. Questions and concerns answered.      Anuja Thurston PA-C  11/15/21  21:42 EST      Dictated Utilizing Dragon Dictation.

## 2021-11-15 ENCOUNTER — TELEPHONE (OUTPATIENT)
Dept: ORTHOPEDIC SURGERY | Facility: CLINIC | Age: 61
End: 2021-11-15

## 2021-11-15 DIAGNOSIS — Z98.890 STATUS POST OPEN REDUCTION AND INTERNAL FIXATION (ORIF) OF FRACTURE: Primary | ICD-10-CM

## 2021-11-15 DIAGNOSIS — Z87.81 STATUS POST OPEN REDUCTION AND INTERNAL FIXATION (ORIF) OF FRACTURE: Primary | ICD-10-CM

## 2021-11-15 DIAGNOSIS — S52.572D OTHER CLOSED INTRA-ARTICULAR FRACTURE OF DISTAL END OF LEFT RADIUS WITH ROUTINE HEALING, SUBSEQUENT ENCOUNTER: ICD-10-CM

## 2021-11-15 NOTE — TELEPHONE ENCOUNTER
Anuja,    Patient called back. [facility] is not in her network. She is not sure who is in her network. What other places offer the needed OT?    Thanks,    Juan

## 2021-11-15 NOTE — TELEPHONE ENCOUNTER
Left a voicemail for the patient to return my call to see if her insurance is out of network for [facility].     Lisa CHENG

## 2021-11-15 NOTE — TELEPHONE ENCOUNTER
PT  Called wanted to know if there would be another place besides [facility] to referral her to. Her insurance is out of network with them. Please give her a call @ [phone]

## 2021-11-23 ENCOUNTER — OFFICE VISIT (OUTPATIENT)
Dept: ORTHOPEDIC SURGERY | Facility: CLINIC | Age: 61
End: 2021-11-23

## 2021-11-23 DIAGNOSIS — Z98.890 STATUS POST OPEN REDUCTION AND INTERNAL FIXATION (ORIF) OF FRACTURE: Primary | ICD-10-CM

## 2021-11-23 DIAGNOSIS — S52.612D CLOSED DISPLACED FRACTURE OF STYLOID PROCESS OF LEFT ULNA WITH ROUTINE HEALING, SUBSEQUENT ENCOUNTER: ICD-10-CM

## 2021-11-23 DIAGNOSIS — Z87.81 STATUS POST OPEN REDUCTION AND INTERNAL FIXATION (ORIF) OF FRACTURE: Primary | ICD-10-CM

## 2021-11-23 DIAGNOSIS — S52.572D OTHER CLOSED INTRA-ARTICULAR FRACTURE OF DISTAL END OF LEFT RADIUS WITH ROUTINE HEALING, SUBSEQUENT ENCOUNTER: ICD-10-CM

## 2021-11-23 PROCEDURE — 99024 POSTOP FOLLOW-UP VISIT: CPT | Performed by: PHYSICIAN ASSISTANT

## 2021-11-23 NOTE — PROGRESS NOTES
[facility] [place] [facility] [place] [facility]        Subjective     Post-op (2 week f/u; 4 weeks s/p OPEN REDUCTION INTERNAL FIXATION DISTAL RADIUS LEFT 10/27/21))       HPI    Lila Rodriguez is a 61 y.o. female.  Patient comes for follow-up status post ORIF left distal radius by Dr. Arellano.  No complaints or issues.  Denies pain.  No reported numbness or tingling.  Has been wearing an Exos brace as directed.    Patient denies any fever, chills, night sweats or other constitutional symptoms.        Objective      Physical Exam  There were no vitals taken for this visit.    There is no height or weight on file to calculate BMI.        Ortho Exam  Left wrist  Skin: Incision well healed without redness, warmth or evidence of infection.     Tenderness: None throughout the wrist  Motion: Limited secondary to immobilization. Able to make a loose composite fist.  Motor: Grossly intact R/U/M/AIN/PIN  Sensory: Grossly intact LT: R/U/M  Vascular: Brisk capillary refill, 2+ radial pulse      Imaging Reviewed:  Ordered left wrist plain films.  Imaging read/interpreted by Dr. Ross.    Left wrist status post ORIF  Indication status post ORIF for distal radius fracture  Compared to prior postoperative images  Findings: Distal radius fracture with comminution and displacement has been treated with ORIF and stable alignment is noted compared to prior postoperative images.      Assessment:  1. Status post open reduction and internal fixation (ORIF) of fracture    2. Other closed intra-articular fracture of distal end of left radius with routine healing, subsequent encounter    3. Closed displaced fracture of styloid process of left ulna with routine healing, subsequent encounter        Plan:  1. Status post ORIF left distal radius and ulnar styloid fracture, stable and helaing.  2. Continue Exos brace.  3. May begin gentle ROM exercises. Already has formal PT to start next Monday.  4. To remain NWB to left wrist.  5. Continue OTC  pain medication as needed.  6. Follow up in 4 weeks for repeat evaluation, to include left wrist imaging.  7. Questions and concerns answered.      Anuja Thurston PA-C  11/28/21  12:38 EST      Dictated Utilizing Dragon Dictation.

## 2021-11-29 ENCOUNTER — TREATMENT (OUTPATIENT)
Dept: PHYSICAL THERAPY | Facility: CLINIC | Age: 61
End: 2021-11-29

## 2021-11-29 ENCOUNTER — TELEPHONE (OUTPATIENT)
Dept: INTERNAL MEDICINE | Facility: CLINIC | Age: 61
End: 2021-11-29

## 2021-11-29 DIAGNOSIS — S52.502A CLOSED FRACTURE OF DISTAL END OF LEFT RADIUS, UNSPECIFIED FRACTURE MORPHOLOGY, INITIAL ENCOUNTER: Primary | ICD-10-CM

## 2021-11-29 PROBLEM — G51.0 BELL'S PALSY: Status: ACTIVE | Noted: 2021-11-28

## 2021-11-29 PROCEDURE — 97161 PT EVAL LOW COMPLEX 20 MIN: CPT | Performed by: PHYSICAL THERAPIST

## 2021-11-29 PROCEDURE — 97140 MANUAL THERAPY 1/> REGIONS: CPT | Performed by: PHYSICAL THERAPIST

## 2021-11-29 PROCEDURE — 97110 THERAPEUTIC EXERCISES: CPT | Performed by: PHYSICAL THERAPIST

## 2021-11-29 NOTE — PROGRESS NOTES
Physical Therapy Initial Evaluation and Plan of Care      Patient: Lila Rodriguez   : 1960  Diagnosis/ICD-10 Code:  Closed fracture of distal end of left radius, unspecified fracture morphology, initial encounter [S52.502A]  Referring practitioner: Anuja Thurston, *  Date of Initial Visit: 2021  Today's Date: 2021  Patient seen for 1 sessions           Subjective Evaluation    History of Present Illness  Date of onset: 10/20/2021  Date of surgery: 10/27/2021  Mechanism of injury: Patient History:  Patient reports she fell on her left wrist while running on 10/20/2021 and underwent ORIF surgery on 10/27/2021. Patient was put in a wrist splint for 2 weeks post surgery and then was placed in a wrist splint 2 weeks post operation to current.    Current Complaint:  Patient reports not being able to lift items with left wrist. Patient is limited in left wrist flexion, extension, supination and pronation. Patient reports no pain but has limited ability with the left extremity. Patient is able to make a full composite fist.       Pain  No pain reported    Patient Goals  Patient goals for therapy: return to sport/leisure activities, independence with ADLs/IADLs, increased strength and increased motion             Objective          Observations     Additional Wrist/Hand Observation Details  (L) Wrist: Patient's scar is healing well with no signs of effusion. Lila's scar has adhesive qualities at the most distal point. Patient is tender to the touch on incision site.     Active Range of Motion     Left Elbow   Forearm supination: 65 degrees   Forearm pronation: 70 degrees     Right Elbow   Forearm supination: 110 degrees   Forearm pronation: 90 degrees     Left Wrist   Wrist flexion: 32 degrees   Wrist extension: 27 degrees   Radial deviation: 9 degrees   Ulnar deviation: 20 degrees     Right Wrist   Wrist flexion: 74 degrees   Wrist extension: 58 degrees   Radial deviation: 22 degrees   Ulnar  deviation: 59 degrees     Strength/Myotome Testing     Left Wrist/Hand   Wrist extension: 4  Wrist flexion: 4+     (2nd hand position)   lbs: 10    Right Wrist/Hand   Wrist extension: 5  Wrist flexion: 5     (2nd hand position)   lbs: 45          Assessment & Plan     Assessment  Impairments: abnormal or restricted ROM, activity intolerance, impaired physical strength, lacks appropriate home exercise program and weight-bearing intolerance  Functional Limitations: carrying objects, lifting, pulling, pushing and unable to perform repetitive tasks  Assessment details: Patient is a 61 year old female who fell while running on 10/20/21. Patient underwent left distal radius ORIF surgery on October 27, 2021. Patient is healing well with no pain but has limitations in wrist AROM, specifically extension and supination. Patient has decreased strength of the left hand and arm as she is unable to lift objects greater than 3 pounds. She is appropriate for physical therapy to address this issue.   Prognosis: good  Prognosis details: NOTE:  Patient is leaving for [place] Dec 1-Dec 12 and was provided with HEP consisting of stretching, self-mobilization, strengthening, and scar treatment to provide treatment until the patient can return to therapy.     Goals  Plan Goals: STGs in 4 weeks:  1. Patient will be able to reach 40 degrees of left wrist extension to facilitate pushing activities.  2. Patient will be able to supinate left wrist 80 degrees to facilitate work related activities.  LTGs in 8 weeks:  1. Patient will have left  strength of 20 lbs or more indicating return to normal strength.  2. Patient will have a Quick DASH score of 25 or less indicating mild to no difficulty during activities of daily living.    Plan  Therapy options: will be seen for skilled therapy services  Planned modality interventions: thermotherapy (paraffin bath), ultrasound, thermotherapy (hydrocollator packs), cryotherapy and contrast bath  immersion  Planned therapy interventions: ADL retraining, orthotic fitting/training, manual therapy, flexibility, functional ROM exercises, home exercise program, joint mobilization, IADL retraining, soft tissue mobilization, strengthening, stretching, therapeutic activities, spinal/joint mobilization, postural training, neuromuscular re-education, motor coordination training, balance/weight-bearing training, body mechanics training and fine motor coordination training  Frequency: 2x week  Duration in weeks: 8  Treatment plan discussed with: family and patient           Access Code: [email or web address]  URL: [email or web address]  Date: 11/29/2021  Prepared by: Selena Manning    Exercises  Wrist Extension Stretch Pronated - 3 x daily - 7 x weekly - 2 sets - 5 reps - 30 hold  Standing Wrist Flexion Stretch - 3 x daily - 7 x weekly - 2 sets - 5 reps - 30 hold  Seated Wrist Flexion PROM Stretch - 3 x daily - 7 x weekly - 2 sets - 5 reps - 30 hold  Forearm Supination PROM - 3 x daily - 7 x weekly - 2 sets - 5 reps - 30 hold  Wrist Extension with Resistance - 1 x daily - 7 x weekly - 2 sets - 10 reps  Wrist Flexion with Resistance - 1 x daily - 7 x weekly - 2 sets - 10 reps  Wrist Ulnar Deviation with Resistance - 1 x daily - 7 x weekly - 2 sets - 10 reps  Seated Wrist Radial Deviation with Anchored Resistance - 1 x daily - 7 x weekly - 2 sets - 10 reps  Forearm Supination with Resistance - 1 x daily - 7 x weekly - 2 sets - 10 reps  Putty Squeezes - 3 x daily - 7 x weekly - 1 sets - 2 min      Manual Therapy:    15     mins  83730;  Therapeutic Exercise:    30     mins  88016;     Neuromuscular Melba:        mins  50565;    Therapeutic Activity:          mins  89110;     Gait Training:           mins  13143;     Ultrasound:          mins  82620;    Electrical Stimulation:         mins  82687 ( );  Dry Needling          mins self-pay    Timed Treatment:   45   mins   Total Treatment:     70   mins      Ailin Martinez  Physical Therapy Student      I was present in the PT Department guiding the student by approving, concurring, and confirming the skilled judgement for all services rendered.     PT SIGNATURE: Selena aMnning, PT   DATE TREATMENT INITIATED: 11/29/2021    Initial Certification  Certification Period: 2/27/2022  I certify that the therapy services are furnished while this patient is under my care.  The services outlined above are required by this patient, and will be reviewed every 90 days.     PHYSICIAN: Anuja Thurston PA-C      DATE:     Please sign and return via fax to [phone].. Thank you, [facility].

## 2021-11-29 NOTE — TELEPHONE ENCOUNTER
PATIENT CALLED.  WAS SEEN IN ER IN [place] AND DIAGNOSED WITH BELL'S PALSY.  JUST WANTED US TO KNOW.  WAS PUT ON PREDNISONE AND ANTIVIRAL MEDICATION.

## 2021-11-30 ENCOUNTER — TREATMENT (OUTPATIENT)
Dept: PHYSICAL THERAPY | Facility: CLINIC | Age: 61
End: 2021-11-30

## 2021-11-30 DIAGNOSIS — S52.502A CLOSED FRACTURE OF DISTAL END OF LEFT RADIUS, UNSPECIFIED FRACTURE MORPHOLOGY, INITIAL ENCOUNTER: Primary | ICD-10-CM

## 2021-11-30 PROCEDURE — 97530 THERAPEUTIC ACTIVITIES: CPT | Performed by: PHYSICAL THERAPIST

## 2021-11-30 PROCEDURE — 97140 MANUAL THERAPY 1/> REGIONS: CPT | Performed by: PHYSICAL THERAPIST

## 2021-12-07 RX ORDER — SOD SULF/POT CHLORIDE/MAG SULF 1.479 G
1 TABLET ORAL ONCE
Qty: 24 TABLET | Refills: 0 | Status: SHIPPED | OUTPATIENT
Start: 2021-12-07 | End: 2021-12-07

## 2021-12-13 ENCOUNTER — TREATMENT (OUTPATIENT)
Dept: PHYSICAL THERAPY | Facility: CLINIC | Age: 61
End: 2021-12-13

## 2021-12-13 DIAGNOSIS — S52.502A CLOSED FRACTURE OF DISTAL END OF LEFT RADIUS, UNSPECIFIED FRACTURE MORPHOLOGY, INITIAL ENCOUNTER: Primary | ICD-10-CM

## 2021-12-13 PROCEDURE — 97110 THERAPEUTIC EXERCISES: CPT | Performed by: PHYSICAL THERAPIST

## 2021-12-13 PROCEDURE — 97140 MANUAL THERAPY 1/> REGIONS: CPT | Performed by: PHYSICAL THERAPIST

## 2021-12-13 NOTE — PROGRESS NOTES
Physical Therapy Daily Progress Note      Patient: Lila Rodriguez   : 1960  Diagnosis/ICD-10 Code:  Closed fracture of distal end of left radius, unspecified fracture morphology, initial encounter [S52.502A]  Referring practitioner: Anuja Thurston, *  Date of Initial Visit: Type: THERAPY  Noted: 2021  Today's Date: 2021  Patient seen for 3 sessions         Lila Rodriguez reports: feels like wrist is doing well.       Objective          Active Range of Motion     Left Wrist   Wrist flexion: 24 degrees   Wrist extension: 32 degrees   Radial deviation: 14 degrees   Ulnar deviation: 23 degrees     Strength/Myotome Testing     Left Wrist/Hand      (2nd hand position)   lbs: 25    Right Wrist/Hand      (2nd hand position)   lbs: 60      See Exercise, Manual, and Modality Logs for complete treatment.       Assessment/Plan  Will continue aggressive joint mobilization and stretching while she is in state.   Progress per Plan of Care           Manual Therapy:    30     mins  09632;  Therapeutic Exercise:    25     mins  40912;     Neuromuscular Melba:        mins  72556;    Therapeutic Activity:          mins  20794;     Gait Training:           mins  97931;     Ultrasound:          mins  86878;    Electrical Stimulation:        mins  06548 ( );  Dry Needling          mins self-pay    Timed Treatment:  55    mins   Total Treatment:      55  mins    Selena Manning PT  Physical Therapist                    
No

## 2021-12-15 ENCOUNTER — TREATMENT (OUTPATIENT)
Dept: PHYSICAL THERAPY | Facility: CLINIC | Age: 61
End: 2021-12-15

## 2021-12-15 DIAGNOSIS — S52.502A CLOSED FRACTURE OF DISTAL END OF LEFT RADIUS, UNSPECIFIED FRACTURE MORPHOLOGY, INITIAL ENCOUNTER: Primary | ICD-10-CM

## 2021-12-15 PROCEDURE — 97110 THERAPEUTIC EXERCISES: CPT | Performed by: PHYSICAL THERAPIST

## 2021-12-15 PROCEDURE — 97140 MANUAL THERAPY 1/> REGIONS: CPT | Performed by: PHYSICAL THERAPIST

## 2021-12-15 NOTE — PROGRESS NOTES
Physical Therapy Daily Progress Note      Patient: iLla Rodriguez   : 1960  Diagnosis/ICD-10 Code:  Closed fracture of distal end of left radius, unspecified fracture morphology, initial encounter [S52.502A]  Referring practitioner: Anuja Thurston, *  Date of Initial Visit: Type: THERAPY  Noted: 2021  Today's Date: 12/15/2021  Patient seen for 4 sessions         Lila Rodriguez reports: sore and achy but tolerable.         Objective   See Exercise, Manual, and Modality Logs for complete treatment.       Assessment/Plan  Will continue to progress mobility and strength as able. Joint hypomobility significant.   Progress per Plan of Care           Manual Therapy:    30     mins  75502;  Therapeutic Exercise:    25     mins  63693;     Neuromuscular Melba:        mins  86190;    Therapeutic Activity:          mins  57420;     Gait Training:           mins  17901;     Ultrasound:          mins  19630;    Electrical Stimulation:        mins  83326 ( );  Dry Needling          mins self-pay    Timed Treatment:   55   mins   Total Treatment:     55   mins    Selena Manning PT  Physical Therapist

## 2021-12-17 ENCOUNTER — HOSPITAL ENCOUNTER (OUTPATIENT)
Dept: BONE DENSITY | Facility: HOSPITAL | Age: 61
Discharge: HOME | End: 2021-12-17
Admitting: INTERNAL MEDICINE

## 2021-12-17 ENCOUNTER — TREATMENT (OUTPATIENT)
Dept: PHYSICAL THERAPY | Facility: CLINIC | Age: 61
End: 2021-12-17

## 2021-12-17 DIAGNOSIS — S52.502A CLOSED FRACTURE OF DISTAL END OF LEFT RADIUS, UNSPECIFIED FRACTURE MORPHOLOGY, INITIAL ENCOUNTER: Primary | ICD-10-CM

## 2021-12-17 DIAGNOSIS — Z13.820 SCREENING FOR OSTEOPOROSIS: ICD-10-CM

## 2021-12-17 PROCEDURE — 77080 DXA BONE DENSITY AXIAL: CPT

## 2021-12-17 PROCEDURE — 97110 THERAPEUTIC EXERCISES: CPT | Performed by: PHYSICAL THERAPIST

## 2021-12-17 PROCEDURE — 97140 MANUAL THERAPY 1/> REGIONS: CPT | Performed by: PHYSICAL THERAPIST

## 2021-12-20 ENCOUNTER — TREATMENT (OUTPATIENT)
Dept: PHYSICAL THERAPY | Facility: CLINIC | Age: 61
End: 2021-12-20

## 2021-12-20 DIAGNOSIS — S52.502A CLOSED FRACTURE OF DISTAL END OF LEFT RADIUS, UNSPECIFIED FRACTURE MORPHOLOGY, INITIAL ENCOUNTER: Primary | ICD-10-CM

## 2021-12-20 PROCEDURE — 97140 MANUAL THERAPY 1/> REGIONS: CPT | Performed by: PHYSICAL THERAPIST

## 2021-12-20 PROCEDURE — 97110 THERAPEUTIC EXERCISES: CPT | Performed by: PHYSICAL THERAPIST

## 2021-12-20 NOTE — PROGRESS NOTES
Physical Therapy Daily Progress Note      Patient: Lila Rodriguez   : 1960  Diagnosis/ICD-10 Code:  Closed fracture of distal end of left radius, unspecified fracture morphology, initial encounter [S52.502A]  Referring practitioner: Anuja Thurston, *  Date of Initial Visit: Type: THERAPY  Noted: 2021  Today's Date: 2021  Patient seen for 5 sessions         Lila Rodriguez reports: still limiting how much she does at home.         Objective   See Exercise, Manual, and Modality Logs for complete treatment.       Assessment/Plan  Will remeasure next visit for progress. Everything typical for this surgery so far.   Progress per Plan of Care and Progress strengthening /stabilization /functional activity           Manual Therapy:    30     mins  96829;  Therapeutic Exercise:    35     mins  40250;     Neuromuscular Melba:        mins  86425;    Therapeutic Activity:          mins  22786;     Gait Training:           mins  74501;     Ultrasound:         mins  02205;    Electrical Stimulation:         mins  40194 ( );  Dry Needling          mins self-pay    Timed Treatment:   55   mins   Total Treatment:     55   mins    Selena Manning, PT  Physical Therapist

## 2021-12-20 NOTE — PROGRESS NOTES
Physical Therapy Daily Progress Note      Patient: Lila Rodriguez   : 1960  Diagnosis/ICD-10 Code:  Closed fracture of distal end of left radius, unspecified fracture morphology, initial encounter [S52.502A]  Referring practitioner: Anuja Thurston, *  Date of Initial Visit: Type: THERAPY  Noted: 2021  Today's Date: 2021  Patient seen for 6 sessions         Lila Rodriguez reports: doing more at home with left hand     Objective   L wrist AROM:  Flex: 35 deg   Ext:  38 deg   Rad dev: 20 deg   Uln dev: 20 deg   Supination: 88 deg     L : 25 lbs   R : 60 lbs     See Exercise, Manual, and Modality Logs for complete treatment.       Assessment/Plan  Wrist motion progressing well. Patient advised to really work on passive wrist flexion.   Progress per Plan of Care           Manual Therapy:    30     mins  71503;  Therapeutic Exercise:    32     mins  34505;     Neuromuscular Melba:        mins  17405;    Therapeutic Activity:          mins  72935;     Gait Training:           mins  03884;     Ultrasound:          mins  90875;    Electrical Stimulation:        mins  65030 ( );  Dry Needling          mins self-pay    Timed Treatment:   62   mins   Total Treatment:     62   mins    Selena Manning PT  Physical Therapist

## 2021-12-22 ENCOUNTER — TREATMENT (OUTPATIENT)
Dept: PHYSICAL THERAPY | Facility: CLINIC | Age: 61
End: 2021-12-22

## 2021-12-22 DIAGNOSIS — S52.502A CLOSED FRACTURE OF DISTAL END OF LEFT RADIUS, UNSPECIFIED FRACTURE MORPHOLOGY, INITIAL ENCOUNTER: Primary | ICD-10-CM

## 2021-12-22 PROCEDURE — 97140 MANUAL THERAPY 1/> REGIONS: CPT | Performed by: PHYSICAL THERAPIST

## 2021-12-22 PROCEDURE — 97110 THERAPEUTIC EXERCISES: CPT | Performed by: PHYSICAL THERAPIST

## 2021-12-22 NOTE — PROGRESS NOTES
Physical Therapy Daily Progress Note      Patient: Lila Rodriguez   : 1960  Diagnosis/ICD-10 Code:  Closed fracture of distal end of left radius, unspecified fracture morphology, initial encounter [S52.502A]  Referring practitioner: Anuja Thurston, *  Date of Initial Visit: Type: THERAPY  Noted: 2021  Today's Date: 2021  Patient seen for 7 sessions         Lila Rodriguez reports: no pain. Was able to rake yard for about an hour without issue.         Objective          Active Range of Motion     Left Wrist   Wrist flexion: 38 degrees   Wrist extension: 42 degrees   Radial deviation: 23 degrees   Ulnar deviation: 34 degrees     Strength/Myotome Testing     Left Wrist/Hand      (2nd hand position)   lbs: 25    Right Wrist/Hand      (2nd hand position)   lbs: 60       See Exercise, Manual, and Modality Logs for complete treatment.       Assessment/Plan  Functionally, patient has made great improvements but is still lacking due to  strength. Wrist extension is good for this type of surgery. Patient still needs to work on  and wrist flexion. Her pain has not been an issue during this recovery. I would recommend she seek out a therapist when she goes to [place] for the manual skills.   Progress per Plan of Care           Manual Therapy:    30     mins  43045;  Therapeutic Exercise:    32     mins  27061;     Neuromuscular Melba:        mins  13160;    Therapeutic Activity:          mins  17287;     Gait Training:           mins  48526;     Ultrasound:          mins  09371;    Electrical Stimulation:        mins  32555 ( );  Dry Needling          mins self-pay    Timed Treatment:   62   mins   Total Treatment:     62   mins    Selena Manning, PT  Physical Therapist

## 2021-12-23 ENCOUNTER — OFFICE VISIT (OUTPATIENT)
Dept: ORTHOPEDIC SURGERY | Facility: CLINIC | Age: 61
End: 2021-12-23

## 2021-12-23 DIAGNOSIS — S52.572D OTHER CLOSED INTRA-ARTICULAR FRACTURE OF DISTAL END OF LEFT RADIUS WITH ROUTINE HEALING, SUBSEQUENT ENCOUNTER: ICD-10-CM

## 2021-12-23 DIAGNOSIS — Z87.81 STATUS POST OPEN REDUCTION AND INTERNAL FIXATION (ORIF) OF FRACTURE: Primary | ICD-10-CM

## 2021-12-23 DIAGNOSIS — Z98.890 STATUS POST OPEN REDUCTION AND INTERNAL FIXATION (ORIF) OF FRACTURE: Primary | ICD-10-CM

## 2021-12-23 PROCEDURE — 99024 POSTOP FOLLOW-UP VISIT: CPT | Performed by: PHYSICIAN ASSISTANT

## 2021-12-23 NOTE — PROGRESS NOTES
[facility] Note        Subjective     Post-op (4 week f/u; 8 weeks s/p OPEN REDUCTION INTERNAL FIXATION DISTAL RADIUS LEFT 10/27/21)       HPI    Lila Rodriguez is a 61 y.o. female.  Patient returns for follow-up status post ORIF left distal radius by Dr. Arellano.  She has no complaints or issues.  Denies any pain.  No reported numbness or tingling.  She has been working with formal therapy regarding range of motion, stretching and strengthening.    Main complaint: Stiffness.    At this time no fever, chills, night sweats or other constitutional symptoms.        Objective      Physical Exam  There were no vitals taken for this visit.    There is no height or weight on file to calculate BMI.        Ortho Exam  Left wrist  Skin: Incision well healed without redness, warmth or evidence of infection.     Tenderness: None throughout the wrist  Motion: See measurements from PT listed below  Motor/sensory: Grossly intact C5-T1    From PT 12/22/2021  Active Range of Motion   Left Wrist   Wrist flexion: 38 degrees   Wrist extension: 42 degrees   Radial deviation: 23 degrees   Ulnar deviation: 34 degrees      Strength/Myotome Testing   Left Wrist/Hand    (2nd hand position)   lbs: 25     Right Wrist/Hand    (2nd hand position)   lbs: 60       Imaging Reviewed:  Ordered left wrist plain films.  Imaging read/interpreted by Dr. Arellano.    Imaging Results (Last 24 Hours)     Procedure Component Value Units Date/Time    XR Wrist 3+ View Left [[id number]] Resulted: 12/23/21 0844     Updated: 12/23/21 0846    Narrative:      Left Wrist X-Ray    Indication: s/p ORIF    Views:  AP, Lateral, and Oblique     Comparison: Left wrist 11/23/2021    Findings:  Patient is status post ORIF of the distal radius  The fracture appears to be healing appropriately.  New bone is visualized.    The hardware is intact.  Small amount of a cortical defect is noted along   the radial styloid  Normal soft  tissues  Normal joint spaces  Alignment appears acceptable.      Impression:  Fracture of the left distal radius s/p ORIF with interval and   near complete healing.          Assessment:  1. Status post open reduction and internal fixation (ORIF) of fracture    2. Other closed intra-articular fracture of distal end of left radius with routine healing, subsequent encounter        Plan:  1. Status post ORIF left distal radius and ulnar styloid fracture, stable  with continued healing.  2. No longer requires Exos brace.  3. Continue with range of motion, stretching and strengthening exercises taught by PT.  4. Patient was provided a referral to take with her down to [place] so she can continue working with formal PT.  5. Continue OTC pain medication as needed.  6. Follow up in  2 months for repeat evaluation, to include left wrist imaging.  This appointment will need to be on a Tuesday or Thursday when Dr. Arellano is also in clinic.  7. Questions and concerns answered.      Anuja Thurston PA-C  12/23/21  08:49 EST      Dictated Utilizing Dragon Dictation.

## 2021-12-27 ENCOUNTER — TREATMENT (OUTPATIENT)
Dept: PHYSICAL THERAPY | Facility: CLINIC | Age: 61
End: 2021-12-27

## 2021-12-27 DIAGNOSIS — S52.502A CLOSED FRACTURE OF DISTAL END OF LEFT RADIUS, UNSPECIFIED FRACTURE MORPHOLOGY, INITIAL ENCOUNTER: Primary | ICD-10-CM

## 2021-12-27 PROCEDURE — 97110 THERAPEUTIC EXERCISES: CPT | Performed by: PHYSICAL THERAPIST

## 2021-12-27 PROCEDURE — 97140 MANUAL THERAPY 1/> REGIONS: CPT | Performed by: PHYSICAL THERAPIST

## 2021-12-27 NOTE — PROGRESS NOTES
Physical Therapy Daily Progress Note      Patient: Lila Rodriguez   : 1960  Diagnosis/ICD-10 Code:  Closed fracture of distal end of left radius, unspecified fracture morphology, initial encounter [S52.502A]  Referring practitioner: Anuja Thurston, *  Date of Initial Visit: Type: THERAPY  Noted: 2021  Today's Date: 2021  Patient seen for 8 sessions         Lila Rodriguez reports: didn't do much with wrist over the holiday but it is still continuing to improve.       Objective   See Exercise, Manual, and Modality Logs for complete treatment.       Assessment/Plan  Patient going out of state for several weeks, plans on seeking out a therapist there to continue and will return to this clinic upon her return. She will continue HEP.   Other  Hold therapy for out of state trip then resume.          Manual Therapy:    30     mins  77731;  Therapeutic Exercise:    32     mins  27006;     Neuromuscular Melba:        mins  80573;    Therapeutic Activity:          mins  22675;     Gait Training:           mins  70180;     Ultrasound:         mins  05615;    Electrical Stimulation:         mins  53140 ( );  Dry Needling          mins self-pay    Timed Treatment:   62   mins   Total Treatment:     62   mins    Selena Manning, PT  Physical Therapist

## 2022-01-11 ENCOUNTER — TREATMENT (OUTPATIENT)
Dept: PHYSICAL THERAPY | Facility: CLINIC | Age: 62
End: 2022-01-11

## 2022-01-11 DIAGNOSIS — S52.502S CLOSED FRACTURE OF DISTAL END OF LEFT RADIUS, UNSPECIFIED FRACTURE MORPHOLOGY, SEQUELA: Primary | ICD-10-CM

## 2022-01-11 PROCEDURE — 97110 THERAPEUTIC EXERCISES: CPT | Performed by: PHYSICAL THERAPIST

## 2022-01-11 PROCEDURE — 97140 MANUAL THERAPY 1/> REGIONS: CPT | Performed by: PHYSICAL THERAPIST

## 2022-01-11 PROCEDURE — 97530 THERAPEUTIC ACTIVITIES: CPT | Performed by: PHYSICAL THERAPIST

## 2022-01-11 NOTE — PROGRESS NOTES
Physical Therapy Daily Progress Note        Patient: Lila Rodriguez   : 1960  Diagnosis/ICD-10 Code:  Closed fracture of distal end of left radius, unspecified fracture morphology, sequela [S52.502S]  Referring practitioner: Anuja Thurston, *  Date of Initial Visit: Type: THERAPY  Noted: 2021  Today's Date: 2022  Patient seen for 9 sessions         Visit Diagnoses:    ICD-10-CM ICD-9-CM   1. Closed fracture of distal end of left radius, unspecified fracture morphology, sequela  S52.502S 905.2       Subjective   Lila Rodriguez reports: did not do much rehab in [facility], did work on her HEP.  Hopes to return to exercise classes soon.    Objective          Strength/Myotome Testing     Left Wrist/Hand      (2nd hand position)   lbs: 37    Thumb Strength  Key/Lateral Pinch     Trial 1: 10 lbs  Palmar/Three-Point Pinch     Trial 1: 11 lbs    Right Wrist/Hand      (2nd hand position)   lbs: 57    Thumb Strength   Key/Lateral Pinch     Trial 1: 15 lbs  Palmar/Three-Point Pinch     Trial 1: 16 lbs        See Exercise, Manual, and Modality Logs for complete treatment.       Assessment/Plan  Limited wrist extension, so may benefit from pushup bars if she wants to return to weight bearing exercise.   strength is improving.  Progress per Plan of Care and Progress strengthening /stabilization /functional activity           Timed:         Manual Therapy:    23     mins  97274;     Therapeutic Exercise:    18     mins  86717;     Neuromuscular Melba:        mins  92362;    Therapeutic Activity:     20     mins  13678;     Gait Training:           mins  25331;     Ultrasound:          mins  64571;    Ionto                                   mins   47708  Self Care                            mins   03564  Canalith Repos         mins 83423      Un-Timed:  Electrical Stimulation:         mins  44858 (MC );  Dry Needling          mins self-pay  Traction          mins 06873      Timed Treatment:   61    mins   Total Treatment:     61   mins    Felipe Fleming, PT, CHT  [place] License: [id number]

## 2022-01-14 ENCOUNTER — TREATMENT (OUTPATIENT)
Dept: PHYSICAL THERAPY | Facility: CLINIC | Age: 62
End: 2022-01-14

## 2022-01-14 DIAGNOSIS — S52.502S CLOSED FRACTURE OF DISTAL END OF LEFT RADIUS, UNSPECIFIED FRACTURE MORPHOLOGY, SEQUELA: Primary | ICD-10-CM

## 2022-01-14 PROCEDURE — 97110 THERAPEUTIC EXERCISES: CPT | Performed by: PHYSICAL THERAPIST

## 2022-01-14 PROCEDURE — 97140 MANUAL THERAPY 1/> REGIONS: CPT | Performed by: PHYSICAL THERAPIST

## 2022-01-14 NOTE — PROGRESS NOTES
Physical Therapy Daily Progress Note      Patient: Lila Rodriguez   : 1960  Diagnosis/ICD-10 Code:  Closed fracture of distal end of left radius, unspecified fracture morphology, sequela [S52.502S]  Referring practitioner: Anuja Thurston, *  Date of Initial Visit: Type: THERAPY  Noted: 2021  Today's Date: 2022  Patient seen for 10 sessions         Lila Rodriguez reports: has done some classes at [facility] with modified exercises and felt good.     Objective   L wrist AROM:  Flex  45 deg   Ext 48 deg   Rad 25 deg   uln  40 deg     Sup 95 deg     See Exercise, Manual, and Modality Logs for complete treatment.       Assessment/Plan  12 weeks post op. Needs to continue working on wrist flexion, given HEP to work on partial weightbearing stretches. Still experiencing capsular tightness.   Progress per Plan of Care           Manual Therapy:    30     mins  42217;  Therapeutic Exercise:    33     mins  86652;     Neuromuscular Melba:        mins  04005;    Therapeutic Activity:         mins  09354;     Gait Training:           mins  10143;     Ultrasound:          mins  46745;    Electrical Stimulation:    =     mins  87567 ( );  Dry Needling          mins self-pay    Timed Treatment:   63   mins   Total Treatment:     63   mins    Selena Manning PT  Physical Therapist

## 2022-01-17 ENCOUNTER — TREATMENT (OUTPATIENT)
Dept: PHYSICAL THERAPY | Facility: CLINIC | Age: 62
End: 2022-01-17

## 2022-01-17 DIAGNOSIS — S52.502S CLOSED FRACTURE OF DISTAL END OF LEFT RADIUS, UNSPECIFIED FRACTURE MORPHOLOGY, SEQUELA: Primary | ICD-10-CM

## 2022-01-17 PROCEDURE — 97140 MANUAL THERAPY 1/> REGIONS: CPT | Performed by: PHYSICAL THERAPIST

## 2022-01-17 PROCEDURE — 97530 THERAPEUTIC ACTIVITIES: CPT | Performed by: PHYSICAL THERAPIST

## 2022-01-17 NOTE — PROGRESS NOTES
Physical Therapy Discharge       Patient: Lila Rodriguez   : 1960  Diagnosis/ICD-10 Code:  Closed fracture of distal end of left radius, unspecified fracture morphology, sequela [S52.502S]  Referring practitioner: Anuja Thurston, *  Date of Initial Visit: Type: THERAPY  Noted: 2021  Today's Date: 2022  Patient seen for 11 sessions         Lila Rodriguez reports: wrist feeling good, did a lot of carrying and housework the past weekend. Felling very close to normal, maybe 85%.         Objective   Strength: WFL all directions     L wrist AROM: 50 deg flex     Function:   Able to carry at least 20 lbs 3 minutes   Able to lift at least 10 lb cast iron with lateral pinch  from one surface to another       See Exercise, Manual, and Modality Logs for complete treatment.       Assessment/Plan  Patient states she feels confident going forward with stretches and continue to exercise at gym.   Other  Patient is discharged with HEP.          Manual Therapy:    23     mins  02364;  Therapeutic Exercise:         mins  99312;     Neuromuscular Melba:        mins  22033;    Therapeutic Activity:     25     mins  71010;     Gait Training:           mins  84080;     Ultrasound:          mins  02709;    Electrical Stimulation:         mins  26896 ( );  Dry Needling         mins self-pay    Timed Treatment:   48   mins   Total Treatment:     48   mins    Selena Manning PT  Physical Therapist

## 2022-01-19 RX ORDER — SOD SULF/POT CHLORIDE/MAG SULF 1.479 G
1 TABLET ORAL ONCE
Qty: 24 TABLET | Refills: 0 | Status: SHIPPED | OUTPATIENT
Start: 2022-01-19 | End: 2022-01-19

## 2022-03-03 ENCOUNTER — OFFICE VISIT (OUTPATIENT)
Dept: ORTHOPEDIC SURGERY | Facility: CLINIC | Age: 62
End: 2022-03-03

## 2022-03-03 VITALS
HEIGHT: 62 IN | SYSTOLIC BLOOD PRESSURE: 112 MMHG | WEIGHT: 125.6 LBS | DIASTOLIC BLOOD PRESSURE: 80 MMHG | BODY MASS INDEX: 23.11 KG/M2

## 2022-03-03 DIAGNOSIS — Z98.890 STATUS POST OPEN REDUCTION AND INTERNAL FIXATION (ORIF) OF FRACTURE: Primary | ICD-10-CM

## 2022-03-03 DIAGNOSIS — Z87.81 STATUS POST OPEN REDUCTION AND INTERNAL FIXATION (ORIF) OF FRACTURE: Primary | ICD-10-CM

## 2022-03-03 DIAGNOSIS — S52.572D OTHER CLOSED INTRA-ARTICULAR FRACTURE OF DISTAL END OF LEFT RADIUS WITH ROUTINE HEALING, SUBSEQUENT ENCOUNTER: ICD-10-CM

## 2022-03-03 PROCEDURE — 99213 OFFICE O/P EST LOW 20 MIN: CPT | Performed by: PHYSICIAN ASSISTANT

## 2022-03-03 NOTE — PROGRESS NOTES
"    [facility]        Subjective     CC: Follow-up (2 month follow up; 4.5 months s/p OPEN REDUCTION INTERNAL FIXATION DISTAL RADIUS LEFT 10/27/21)      HPI    Lila Rodriguez is a 61 y.o. female.  Patient returns today for follow-up evaluation left distal radius ORIF.  She has no complaints or issues at this time.  She is doing well.  Denies any pain.  No reported numbness or tingling.  She has returned to activity as tolerated, to include playing pickle ball, doing planks, etc.    Overall, patient's symptoms are improved.    ROS:    Constiutional:Pt denies fever, chills, nausea, or vomiting.  MSK:as above        Objective      Past Medical History  Past Medical History:   Diagnosis Date   • Bell's palsy 11/28/2021   • Colon polyps    • Fracture of wrist 10/20/21   • Fracture, radius 10/20/21   • Fracture, ulna 10/20/21   • Ganglion cyst     on thumb   • H/O bone density study 04/26/2016   • H/O mammogram 09/16/2020, 04/15/2019   • IBS (irritable bowel syndrome)    • Pap smear for cervical cancer screening 02/2020    Dr. Bob         Physical Exam  /80   Ht 157.5 cm (62.01\")   Wt 57 kg (125 lb 9.6 oz)   BMI 22.97 kg/m²     Body mass index is 22.97 kg/m².    Patient is well nourished and well developed.        Ortho Exam  Left wrist  Skin: Grossly intact without any redness warmth or swelling.  Incision is well-healed.  Tenderness: No palpable tenderness on exam today.  Motion: Only limitation is in flexion but this is within functional limits.  Patient is able to make a composite fist.  Motor/sensory: Grossly intact C5-T1.      Imaging/Labs/EMG Reviewed:  Ordered left wrist plain films.  Imaging read/interpreted by Dr. Arellano.    Imaging Results (Last 24 Hours)     Procedure Component Value Units Date/Time    XR Wrist 3+ View Left [[id number]] Resulted: 03/03/22 0925     Updated: 03/03/22 0926    Narrative:      Left Wrist X-Ray    Indication: s/p ORIF    Views:  AP, Lateral, " and Oblique     Comparison: Left wrist 12/23/2021    Findings:  Patient is status post ORIF of the distal radius  The fracture appears to be healing appropriately.  New bone is visualized.    The hardware is intact.    Normal soft tissues  Normal joint spaces  Alignment appears acceptable.      Impression:  Fracture of the left distal radius s/p ORIF with signs of   complete healing.          Assessment:  1. Status post open reduction and internal fixation (ORIF) of fracture    2. Other closed intra-articular fracture of distal end of left radius with routine healing, subsequent encounter        Plan:  1. Status post ORIF left distal radius fracture, healed.  2. Ulnar styloid avulsion fracture stable.  3. She will continue with stretching and range of motion exercises to the wrist.  4. Patient may continue advancing activity as tolerated.  5. Continue with over-the-counter pain medication as needed.  6. At this time will be discharged from our clinic with follow-up as needed.  7. Questions and concerns answered.      Anuja Thurston PA-C  03/03/22  09:48 EST      Dictated Utilizing Dragon Dictation.

## 2022-03-23 ENCOUNTER — OUTSIDE FACILITY SERVICE (OUTPATIENT)
Dept: GASTROENTEROLOGY | Facility: CLINIC | Age: 62
End: 2022-03-23

## 2022-03-23 PROCEDURE — 88305 TISSUE EXAM BY PATHOLOGIST: CPT | Performed by: INTERNAL MEDICINE

## 2022-03-23 PROCEDURE — 45385 COLONOSCOPY W/LESION REMOVAL: CPT | Performed by: INTERNAL MEDICINE

## 2022-03-24 ENCOUNTER — LAB REQUISITION (OUTPATIENT)
Dept: LAB | Facility: HOSPITAL | Age: 62
End: 2022-03-24

## 2022-03-24 DIAGNOSIS — Z86.010 PERSONAL HISTORY OF COLONIC POLYPS: ICD-10-CM

## 2022-03-24 DIAGNOSIS — D12.3 BENIGN NEOPLASM OF TRANSVERSE COLON: ICD-10-CM

## 2022-03-24 DIAGNOSIS — D12.0 BENIGN NEOPLASM OF CECUM: ICD-10-CM

## 2022-04-15 DIAGNOSIS — F41.8 SITUATIONAL ANXIETY: ICD-10-CM

## 2022-04-15 RX ORDER — ALPRAZOLAM 0.25 MG/1
TABLET ORAL
Qty: 30 TABLET | Refills: 2 | Status: CANCELLED | OUTPATIENT
Start: 2022-04-15

## 2022-04-15 NOTE — TELEPHONE ENCOUNTER
Caller: Lila Rodriguez    Relationship: Self    Best call back number: [phone]    Requested Prescriptions:   Requested Prescriptions     Pending Prescriptions Disp Refills   • ALPRAZolam (Xanax) 0.25 MG tablet 30 tablet 2     Si/2-1 qd as needed for anxiety        Pharmacy where request should be sent: [facility] [phone] [facility] [phone]      Additional details provided by patient: [phone]    Does the patient have less than a 3 day supply:  [x] Yes  [] No    Marielena Snider, PCT   04/15/22 15:56 EDT

## 2022-04-18 NOTE — TELEPHONE ENCOUNTER
LV: 8/18/21  NV: 8/24/22  LF: 8/18/21 30/2  CSC: 8/6/2020 needs a new one  UDS: 6/23/21    Left message for patient letting her know she will need a follow up appointment before med refill.

## 2022-04-19 ENCOUNTER — TELEMEDICINE (OUTPATIENT)
Dept: INTERNAL MEDICINE | Facility: CLINIC | Age: 62
End: 2022-04-19

## 2022-04-19 DIAGNOSIS — F41.8 SITUATIONAL ANXIETY: ICD-10-CM

## 2022-04-19 PROCEDURE — 99213 OFFICE O/P EST LOW 20 MIN: CPT | Performed by: PHYSICIAN ASSISTANT

## 2022-04-19 RX ORDER — ALPRAZOLAM 0.25 MG/1
TABLET ORAL
Qty: 30 TABLET | Refills: 0 | Status: SHIPPED | OUTPATIENT
Start: 2022-04-19 | End: 2022-08-24 | Stop reason: SDUPTHER

## 2022-04-19 NOTE — ASSESSMENT & PLAN NOTE
UDS 6.23.21  CSA 8.6.20 due. Pt to fill out next time she is in office for visit as today was telehealth.   Cameron [id number] compliant  Refill given. F/u prn

## 2022-04-19 NOTE — PROGRESS NOTES
Mode of Visit: Video  Location of patient: Home   You have chosen to receive care through a telehealth visit.  Do you consent to use a audio/video connection for your medical care today? Yes  This was an audio and video enabled telemedicine encounter.  No technical issues occurred during this visit.    Chief Complaint  Anxiety    Subjective          History of Present Illness  Lila Rodriguez presents to [facility] for   Anxiety:  Takes xanax prn for anxiety related to flying and travel. Last rx was last year. She is requesting a refill. Is getting ready to go to [place]. Has been several times in the last year to visit family. She has anxiety related to her IBS sx and being stuck in a seat during take off and turbulence. Does not have daily anxiety sx or have to take a daily anxiety medication.      Review of Systems   Constitutional: Negative for fever and unexpected weight loss.   Respiratory: Negative for cough, shortness of breath and wheezing.    Cardiovascular: Negative for chest pain and palpitations.   Psychiatric/Behavioral: The patient is nervous/anxious.        The following portions of the patient's history were reviewed and updated as appropriate: allergies, current medications, past family history, past medical history, past social history, past surgical history and problem list.    No Known Allergies  Current Outpatient Medications on File Prior to Visit   Medication Sig Dispense Refill   • methylcellulose, Laxative, (Citrucel) 500 MG tablet tablet Take 4 tablets by mouth Every 4 (Four) Hours As Needed.     • TURMERIC PO Take 2 tablets by mouth Daily.       No current facility-administered medications on file prior to visit.     New Medications Ordered This Visit   Medications   • ALPRAZolam (Xanax) 0.25 MG tablet     Si/2-1 qd as needed for anxiety     Dispense:  30 tablet     Refill:  0     Social History     Tobacco Use   Smoking Status Former Smoker   •  Packs/day: 1.00   • Years: 20.00   • Pack years: 20.00   • Types: Cigarettes   • Quit date: 2001   • Years since quittin.3   Smokeless Tobacco Never Used   Tobacco Comment    quit age 41 1ppd x 20 yrs        Objective   There were no vitals filed for this visit.  There is no height or weight on file to calculate BMI.    Physical Exam   Constitutional: She appears well-developed and well-nourished. No distress.   HENT:   Head: Normocephalic and atraumatic.   Eyes: Conjunctivae and EOM are normal.   Neck: Neck normal appearance.  Pulmonary/Chest: Effort normal.  No respiratory distress. She no audible wheeze...  Neurological: She is alert.   Psychiatric: She has a normal mood and affect.   Vitals reviewed.      Result Review :{ Labs  Result Review  Imaging  Med Tab  Media :23}        Assessment and Plan    Diagnoses and all orders for this visit:    1. Situational anxiety  Assessment & Plan:  UDS 6..  CSA 8.6.20 due. Pt to fill out next time she is in office for visit as today was telehealth.   Cameron [id number] compliant  Refill given. F/u prn    Orders:  -     ALPRAZolam (Xanax) 0.25 MG tablet; 1/2-1 qd as needed for anxiety  Dispense: 30 tablet; Refill: 0          Follow Up   Return if symptoms worsen or fail to improve.    Follow up if symptoms worsen or persist or has new or concerning symptoms, go to ER for severe symptoms.   I discussed my findings, recommendations, and plan of care with the patient. Reviewed common medication effects and side effects and to report side effects immediately. Encouraged medication compliance and the importance of keeping scheduled follow up appointments. Pt verbalized understanding and agreement.  If a referral was made please contact our office if you have not heard about an appointment in the next 2 weeks.   If labs or images are ordered we will contact you with the results within the next week.  If you have not heard from us after a week please call our office  to inquire about the results.     I have reviewed the limitations of a telehealth visit (such as lack of a physical exam and unable to obtain vital signs) and advised the patient that they may need to follow up for an office visit should their symptoms or concerns persist, worsen, or change.    CHIKA IbarraC    * Please note that portions of this note were completed with a voice recognition program.

## 2022-05-24 ENCOUNTER — OFFICE VISIT (OUTPATIENT)
Dept: INTERNAL MEDICINE | Facility: CLINIC | Age: 62
End: 2022-05-24

## 2022-05-24 VITALS
SYSTOLIC BLOOD PRESSURE: 110 MMHG | DIASTOLIC BLOOD PRESSURE: 70 MMHG | RESPIRATION RATE: 16 BRPM | BODY MASS INDEX: 23.66 KG/M2 | OXYGEN SATURATION: 99 % | TEMPERATURE: 98 F | HEART RATE: 77 BPM | WEIGHT: 129.4 LBS

## 2022-05-24 DIAGNOSIS — L30.9 DERMATITIS: Primary | ICD-10-CM

## 2022-05-24 PROCEDURE — 99213 OFFICE O/P EST LOW 20 MIN: CPT | Performed by: STUDENT IN AN ORGANIZED HEALTH CARE EDUCATION/TRAINING PROGRAM

## 2022-05-24 RX ORDER — TRIAMCINOLONE ACETONIDE 5 MG/G
1 OINTMENT TOPICAL 2 TIMES DAILY
Qty: 15 G | Refills: 0 | Status: SHIPPED | OUTPATIENT
Start: 2022-05-24

## 2022-05-25 NOTE — PROGRESS NOTES
Chief Complaint  Insect Bite (Got bit on Thursday but has noticed more redness, around the area, no swelling)    Lila Rodriguez presents to [facility]      Subjective   She presents to clinic for evaluation of rash on her left breast.  Patient states that she was bit by a bug about 5 days ago.  The rash is red and itchy.  She denies any breast pain.  Denies any abnormal mass on palpation.  Denies any nipple discharge.  Denies any nausea, vomiting, fevers, chills.    The following portions of the patient's history were reviewed and updated as appropriate: allergies, current medications, past family history, past medical history, past social history, past surgical history and problem list.      Health Maintenance   Topic Date Due   • COVID-19 Vaccine (4 - Booster for Moderna series) 2022   • INFLUENZA VACCINE  2022   • ANNUAL PHYSICAL  2022   • MAMMOGRAM  2023   • COLORECTAL CANCER SCREENING  2025   • TDAP/TD VACCINES (3 - Td or Tdap) 2028   • HEPATITIS C SCREENING  Completed   • ZOSTER VACCINE  Completed   • Pneumococcal Vaccine 0-64  Aged Out   • PAP SMEAR  Discontinued         Procedures       Past Medical History:   Diagnosis Date   • Bell's palsy 2021   • Colon polyps    • Fracture of wrist 10/20/21   • Fracture, radius 10/20/21   • Fracture, ulna 10/20/21   • Ganglion cyst     on thumb   • H/O bone density study 2016   • H/O mammogram 2020, 04/15/2019   • IBS (irritable bowel syndrome)    • Pap smear for cervical cancer screening 2020    Dr. Bob      No Known Allergies   Social History     Tobacco Use   • Smoking status: Former Smoker     Packs/day: 1.00     Years: 20.00     Pack years: 20.00     Types: Cigarettes     Quit date: 2001     Years since quittin.4   • Smokeless tobacco: Never Used   • Tobacco comment: quit age 41 1ppd x 20 yrs   Vaping Use   • Vaping Use: Never used   Substance Use Topics   • Alcohol  use: Yes     Comment: wine 1/night   • Drug use: No     Past Surgical History:   Procedure Laterality Date   • BLADDER SURGERY  2002     bladder tack - Dr Bob   • COLONOSCOPY  2012    neg repeat in 5 years (Philomena) previous polyps   • COLONOSCOPY W/ POLYPECTOMY  08/11/2021    Dr. Quach, will repeat in 6 months   • WRIST FRACTURE SURGERY Left 10/27/2021    Procedure: OPEN REDUCTION INTERNAL FIXATION DISTAL RADIUS LEFT;  Surgeon: Finn Arellano MD;  Location: [facility];  Service: Orthopedics;  Laterality: Left;      Family History   Problem Relation Age of Onset   • Atrial fibrillation Mother    • Heart failure Mother         chronic   • Other Mother         hyponatremia   • Hypothyroidism Mother    • Hyperlipidemia Mother    • Anxiety disorder Mother    • COPD Mother    • Hyperlipidemia Father    • Coronary artery disease Father    • Hypertension Father    • Prostate cancer Father    • Colon cancer Maternal Aunt    • Colon cancer Maternal Aunt    • Breast cancer Neg Hx    • Ovarian cancer Neg Hx          Current Outpatient Medications:   •  ALPRAZolam (Xanax) 0.25 MG tablet, 1/2-1 qd as needed for anxiety, Disp: 30 tablet, Rfl: 0  •  methylcellulose, Laxative, (CITRUCEL) 500 MG tablet tablet, Take 4 tablets by mouth Every 4 (Four) Hours As Needed., Disp: , Rfl:   •  TURMERIC PO, Take 2 tablets by mouth Daily., Disp: , Rfl:   •  triamcinolone (KENALOG) 0.5 % ointment, Apply 1 application topically to the appropriate area as directed 2 (Two) Times a Day., Disp: 15 g, Rfl: 0    Objective   Vital Signs  /70   Pulse 77   Temp 98 °F (36.7 °C) (Infrared)   Resp 16   Wt 58.7 kg (129 lb 6.4 oz)   SpO2 99%   BMI 23.66 kg/m²   Body mass index is 23.66 kg/m².     Physical Exam  Constitutional:       General: She is not in acute distress.     Appearance: She is not toxic-appearing.   Cardiovascular:      Rate and Rhythm: Normal rate and regular rhythm.   Pulmonary:      Effort: Pulmonary effort is normal.       Breath sounds: Normal breath sounds.   Chest:              Assessment and Plan  Diagnoses and all orders for this visit:    1. Dermatitis (Primary)  -     triamcinolone (KENALOG) 0.5 % ointment; Apply 1 application topically to the appropriate area as directed 2 (Two) Times a Day.  Dispense: 15 g; Refill: 0       Rash appears to be some form of dermatitis potentially from a bug bite.  We will treat with moderate potency steroid 2 times a day.  If symptoms worsen or fail to improve return to clinic.        Follow Up    Return if symptoms worsen or fail to improve.    Patient was given instructions and counseling regarding her condition or for health maintenance advice. Please see specific information pulled into the AVS if appropriate.    Electronically signed by:   John Corcoran MD  05/24/2022

## 2022-08-20 NOTE — PROGRESS NOTES
Here for physical    Exercise: runs/walks daily, weights  Diet: healthy overall, takes citricel, Zija. Not a lot of dairy in diet but some.she is taking calcium/D. 1 wine nightly     Vitamin D deficiency- last 2 years vit  D level has been good      Situational anxiety -uses xanax when she flies and needs refill      IBS - Does fiber regualrly as well  Had colon done last year and no colitis on random biopsy, but did have 2 polyps and will need a f/u colon in 6m      Current Outpatient Medications:   •  ALPRAZolam (Xanax) 0.25 MG tablet, 1/2-1 qd as needed for anxiety, Disp: 30 tablet, Rfl: 0  •  calcium carbonate-cholecalciferol 500-400 MG-UNIT tablet tablet, Take 2 tablets by mouth Daily., Disp: , Rfl:   •  methylcellulose, Laxative, (CITRUCEL) 500 MG tablet tablet, Take 4 tablets by mouth Every 4 (Four) Hours As Needed., Disp: , Rfl:   •  TURMERIC PO, Take 2 tablets by mouth Daily., Disp: , Rfl:   •  triamcinolone (KENALOG) 0.5 % ointment, Apply 1 application topically to the appropriate area as directed 2 (Two) Times a Day., Disp: 15 g, Rfl: 0    The following portions of the patient's history were reviewed and updated as appropriate: allergies, current medications, past family history, past medical history, past social history, past surgical history and problem list.    Review of Systems   Constitutional: Negative for activity change, appetite change, fever, unexpected weight gain and unexpected weight loss.   HENT: Negative.    Eyes: Negative.    Respiratory: Negative for shortness of breath and wheezing.    Cardiovascular: Negative for chest pain, palpitations and leg swelling.   Gastrointestinal: Negative.  Negative for constipation and diarrhea.   Endocrine: Negative.    Genitourinary: Negative for difficulty urinating and dysuria.   Skin: Negative.    Allergic/Immunologic: Negative for immunocompromised state.   Neurological: Negative for seizures, speech difficulty, memory problem and confusion.  "  Hematological: Does not bruise/bleed easily.   Psychiatric/Behavioral: Negative for agitation. The patient is nervous/anxious (w/ travel, uses xanax on plane trips).          Objective    /76 (BP Location: Right arm, Patient Position: Sitting)   Pulse 89   Temp 97.3 °F (36.3 °C) (Infrared)   Ht 157.5 cm (62\")   Wt 57.6 kg (127 lb)   SpO2 99%   BMI 23.23 kg/m²   Physical Exam   Physical Exam  Vitals and nursing note reviewed.   Constitutional:       General: She is not in acute distress.     Appearance: She is well-developed. She is not diaphoretic.   HENT:      Head: Normocephalic and atraumatic.      Right Ear: External ear normal.      Left Ear: External ear normal.      Nose: Nose normal.      Mouth/Throat:      Pharynx: No oropharyngeal exudate.   Eyes:      General: No scleral icterus.        Right eye: No discharge.         Left eye: No discharge.      Conjunctiva/sclera: Conjunctivae normal.      Pupils: Pupils are equal, round, and reactive to light.   Neck:      Thyroid: No thyromegaly.   Cardiovascular:      Rate and Rhythm: Normal rate and regular rhythm.      Heart sounds: Normal heart sounds. No murmur heard.    No friction rub. No gallop.   Pulmonary:      Effort: Pulmonary effort is normal. No respiratory distress.      Breath sounds: Normal breath sounds. No wheezing or rales.   Abdominal:      General: Bowel sounds are normal. There is no distension.      Palpations: Abdomen is soft. There is no mass.      Tenderness: There is no abdominal tenderness. There is no guarding or rebound.   Musculoskeletal:         General: No deformity. Normal range of motion.      Cervical back: Normal range of motion and neck supple.   Lymphadenopathy:      Cervical: No cervical adenopathy.   Skin:     General: Skin is warm and dry.      Coloration: Skin is not pale.      Findings: No erythema or rash.   Neurological:      Mental Status: She is alert and oriented to person, place, and time.      " Coordination: Coordination normal.      Deep Tendon Reflexes: Reflexes normal.   Psychiatric:         Behavior: Behavior normal.         Thought Content: Thought content normal.         Judgment: Judgment normal.           Assessment/Plan   Diagnoses and all orders for this visit:    1. Routine general medical examination at a health care facility (Primary)  Regular exercise/healthy diet. BSE q month. Sunscreen use encouraged.  Check fasting labs  Lilian due 9/22- she will call to schedule  Colon due 3/25 ([facility])  DT due 8/28  covid vaccine - she had  DEXA due12/23 (osteopenia)  Shingles- she had shingrix  paps through GYN (Izabella Bob)  -     POC Urinalysis Dipstick, Automated  -     CBC (No Diff); Future  -     TSH Rfx On Abnormal To Free T4; Future  -     Lipid Panel; Future  -     Comprehensive Metabolic Panel; Future    2. Situational anxiety- uses xanax occasionally. Pt has already signed controlled substance contract. Cameron done today and appropriate.   -     ALPRAZolam (Xanax) 0.25 MG tablet; 1/2-1 qd as needed for anxiety  Dispense: 30 tablet; Refill: 0

## 2022-08-24 ENCOUNTER — OFFICE VISIT (OUTPATIENT)
Dept: INTERNAL MEDICINE | Facility: CLINIC | Age: 62
End: 2022-08-24

## 2022-08-24 ENCOUNTER — LAB (OUTPATIENT)
Dept: LAB | Facility: HOSPITAL | Age: 62
End: 2022-08-24

## 2022-08-24 VITALS
WEIGHT: 127 LBS | HEART RATE: 89 BPM | SYSTOLIC BLOOD PRESSURE: 114 MMHG | DIASTOLIC BLOOD PRESSURE: 76 MMHG | HEIGHT: 62 IN | OXYGEN SATURATION: 99 % | BODY MASS INDEX: 23.37 KG/M2 | TEMPERATURE: 97.3 F

## 2022-08-24 DIAGNOSIS — Z00.00 ROUTINE GENERAL MEDICAL EXAMINATION AT A HEALTH CARE FACILITY: ICD-10-CM

## 2022-08-24 DIAGNOSIS — Z00.00 ROUTINE GENERAL MEDICAL EXAMINATION AT A HEALTH CARE FACILITY: Primary | ICD-10-CM

## 2022-08-24 DIAGNOSIS — F41.8 SITUATIONAL ANXIETY: ICD-10-CM

## 2022-08-24 LAB
ALBUMIN SERPL-MCNC: 4.2 G/DL (ref 3.5–5.2)
ALBUMIN/GLOB SERPL: 1.6 G/DL
ALP SERPL-CCNC: 63 U/L (ref 39–117)
ALT SERPL W P-5'-P-CCNC: 14 U/L (ref 1–33)
ANION GAP SERPL CALCULATED.3IONS-SCNC: 9 MMOL/L (ref 5–15)
AST SERPL-CCNC: 17 U/L (ref 1–32)
BILIRUB BLD-MCNC: NEGATIVE MG/DL
BILIRUB SERPL-MCNC: 0.3 MG/DL (ref 0–1.2)
BUN SERPL-MCNC: 12 MG/DL (ref 8–23)
BUN/CREAT SERPL: 20.3 (ref 7–25)
CALCIUM SPEC-SCNC: 9.4 MG/DL (ref 8.6–10.5)
CHLORIDE SERPL-SCNC: 105 MMOL/L (ref 98–107)
CHOLEST SERPL-MCNC: 214 MG/DL (ref 0–200)
CLARITY, POC: CLEAR
CO2 SERPL-SCNC: 25 MMOL/L (ref 22–29)
COLOR UR: YELLOW
CREAT SERPL-MCNC: 0.59 MG/DL (ref 0.57–1)
DEPRECATED RDW RBC AUTO: 43.6 FL (ref 37–54)
EGFRCR SERPLBLD CKD-EPI 2021: 101.4 ML/MIN/1.73
ERYTHROCYTE [DISTWIDTH] IN BLOOD BY AUTOMATED COUNT: 12.7 % (ref 12.3–15.4)
EXPIRATION DATE: NORMAL
GLOBULIN UR ELPH-MCNC: 2.6 GM/DL
GLUCOSE SERPL-MCNC: 92 MG/DL (ref 65–99)
GLUCOSE UR STRIP-MCNC: NEGATIVE MG/DL
HCT VFR BLD AUTO: 39.6 % (ref 34–46.6)
HDLC SERPL-MCNC: 85 MG/DL (ref 40–60)
HGB BLD-MCNC: 13.2 G/DL (ref 12–15.9)
KETONES UR QL: NEGATIVE
LDLC SERPL CALC-MCNC: 122 MG/DL (ref 0–100)
LDLC/HDLC SERPL: 1.43 {RATIO}
LEUKOCYTE EST, POC: NEGATIVE
Lab: NORMAL
MCH RBC QN AUTO: 31.2 PG (ref 26.6–33)
MCHC RBC AUTO-ENTMCNC: 33.3 G/DL (ref 31.5–35.7)
MCV RBC AUTO: 93.6 FL (ref 79–97)
NITRITE UR-MCNC: NEGATIVE MG/ML
PH UR: 6 [PH] (ref 5–8)
PLATELET # BLD AUTO: 314 10*3/MM3 (ref 140–450)
PMV BLD AUTO: 9.9 FL (ref 6–12)
POTASSIUM SERPL-SCNC: 4.1 MMOL/L (ref 3.5–5.2)
PROT SERPL-MCNC: 6.8 G/DL (ref 6–8.5)
PROT UR STRIP-MCNC: NEGATIVE MG/DL
RBC # BLD AUTO: 4.23 10*6/MM3 (ref 3.77–5.28)
RBC # UR STRIP: NEGATIVE /UL
SODIUM SERPL-SCNC: 139 MMOL/L (ref 136–145)
SP GR UR: 1.02 (ref 1–1.03)
TRIGL SERPL-MCNC: 39 MG/DL (ref 0–150)
TSH SERPL DL<=0.05 MIU/L-ACNC: 1.03 UIU/ML (ref 0.27–4.2)
UROBILINOGEN UR QL: NORMAL
VLDLC SERPL-MCNC: 7 MG/DL (ref 5–40)
WBC NRBC COR # BLD: 5.03 10*3/MM3 (ref 3.4–10.8)

## 2022-08-24 PROCEDURE — 84443 ASSAY THYROID STIM HORMONE: CPT | Performed by: INTERNAL MEDICINE

## 2022-08-24 PROCEDURE — 81003 URINALYSIS AUTO W/O SCOPE: CPT | Performed by: INTERNAL MEDICINE

## 2022-08-24 PROCEDURE — 99396 PREV VISIT EST AGE 40-64: CPT | Performed by: INTERNAL MEDICINE

## 2022-08-24 PROCEDURE — 80053 COMPREHEN METABOLIC PANEL: CPT | Performed by: INTERNAL MEDICINE

## 2022-08-24 PROCEDURE — 85027 COMPLETE CBC AUTOMATED: CPT | Performed by: INTERNAL MEDICINE

## 2022-08-24 PROCEDURE — 80061 LIPID PANEL: CPT | Performed by: INTERNAL MEDICINE

## 2022-08-24 RX ORDER — ALPRAZOLAM 0.25 MG/1
TABLET ORAL
Qty: 30 TABLET | Refills: 0 | Status: SHIPPED | OUTPATIENT
Start: 2022-08-24

## 2022-10-13 ENCOUNTER — TRANSCRIBE ORDERS (OUTPATIENT)
Dept: ADMINISTRATIVE | Facility: HOSPITAL | Age: 62
End: 2022-10-13

## 2022-10-13 DIAGNOSIS — Z12.31 VISIT FOR SCREENING MAMMOGRAM: Primary | ICD-10-CM

## 2022-10-17 ENCOUNTER — HOSPITAL ENCOUNTER (OUTPATIENT)
Dept: MAMMOGRAPHY | Facility: HOSPITAL | Age: 62
Discharge: HOME | End: 2022-10-17
Admitting: INTERNAL MEDICINE

## 2022-10-17 DIAGNOSIS — Z12.31 VISIT FOR SCREENING MAMMOGRAM: ICD-10-CM

## 2022-10-17 PROCEDURE — 77067 SCR MAMMO BI INCL CAD: CPT

## 2022-10-17 PROCEDURE — 77063 BREAST TOMOSYNTHESIS BI: CPT

## 2022-10-17 PROCEDURE — 77067 SCR MAMMO BI INCL CAD: CPT | Performed by: RADIOLOGY

## 2022-10-17 PROCEDURE — 77063 BREAST TOMOSYNTHESIS BI: CPT | Performed by: RADIOLOGY

## 2023-08-27 NOTE — PROGRESS NOTES
Here for physical    Exercise: runs/walks daily, weights  Diet: healthy overall, takes citricel, Zija. Not a lot of dairy in diet but some.she is taking calcium/D. Tumeric. 1 wine nightly     Vitamin D deficiency-she takes the calcium/D supplement regularly     Situational anxiety -uses xanax when she flies.  Still has a few but would like to go ahead and get a refill today      IBS - Does fiber regualrly and has been well controlled    Her mother did pass away last November with a stroke        Current Outpatient Medications:     ALPRAZolam (Xanax) 0.25 MG tablet, 1/2-1 qd as needed for anxiety, Disp: 30 tablet, Rfl: 0    Calcium Carb-Cholecalciferol (calcium carbonate-cholecalciferol) 500-400 MG-UNIT tablet tablet, Take 2 tablets by mouth Daily., Disp: , Rfl:     methylcellulose, Laxative, (CITRUCEL) 500 MG tablet tablet, Take 4 tablets by mouth Every 4 (Four) Hours As Needed., Disp: , Rfl:     TURMERIC PO, Take 2 tablets by mouth Daily., Disp: , Rfl:     VITAMIN D PO, Take 1 capsule by mouth Daily., Disp: , Rfl:     triamcinolone (KENALOG) 0.5 % ointment, Apply 1 application topically to the appropriate area as directed 2 (Two) Times a Day., Disp: 15 g, Rfl: 0    The following portions of the patient's history were reviewed and updated as appropriate: allergies, current medications, past family history, past medical history, past social history, past surgical history and problem list.    Review of Systems   Constitutional:  Negative for activity change, appetite change, fever, unexpected weight gain and unexpected weight loss.   HENT: Negative.     Eyes: Negative.    Respiratory:  Negative for shortness of breath and wheezing.    Cardiovascular:  Negative for chest pain, palpitations and leg swelling.   Gastrointestinal: Negative.  Negative for constipation and diarrhea.   Endocrine: Negative.    Genitourinary:  Negative for difficulty urinating and dysuria.   Skin: Negative.    Allergic/Immunologic: Negative for  "immunocompromised state.   Neurological:  Negative for seizures, speech difficulty, memory problem and confusion.   Hematological:  Does not bruise/bleed easily.   Psychiatric/Behavioral:  Negative for agitation. Hyperactivity: w/ flying.The patient is nervous/anxious.        Objective    /76 (BP Location: Right arm, Patient Position: Sitting)   Pulse 72   Temp 98 øF (36.7 øC) (Infrared)   Ht 157.5 cm (62\")   Wt 58.5 kg (129 lb)   SpO2 99%   BMI 23.59 kg/mý   Physical Exam   Physical Exam  Vitals and nursing note reviewed.   Constitutional:       General: She is not in acute distress.     Appearance: She is well-developed. She is not diaphoretic.   HENT:      Head: Normocephalic and atraumatic.      Right Ear: External ear normal.      Left Ear: External ear normal.      Nose: Nose normal.      Mouth/Throat:      Pharynx: No oropharyngeal exudate.   Eyes:      General: No scleral icterus.        Right eye: No discharge.         Left eye: No discharge.      Conjunctiva/sclera: Conjunctivae normal.      Pupils: Pupils are equal, round, and reactive to light.   Neck:      Thyroid: No thyromegaly.   Cardiovascular:      Rate and Rhythm: Normal rate and regular rhythm.      Heart sounds: Normal heart sounds. No murmur heard.    No friction rub. No gallop.   Pulmonary:      Effort: Pulmonary effort is normal. No respiratory distress.      Breath sounds: Normal breath sounds. No wheezing or rales.   Abdominal:      General: Bowel sounds are normal. There is no distension.      Palpations: Abdomen is soft. There is no mass.      Tenderness: There is no abdominal tenderness. There is no guarding or rebound.   Musculoskeletal:         General: No deformity. Normal range of motion.      Cervical back: Normal range of motion and neck supple.   Lymphadenopathy:      Cervical: No cervical adenopathy.   Skin:     General: Skin is warm and dry.      Coloration: Skin is not pale.      Findings: No erythema or rash. "   Neurological:      Mental Status: She is alert and oriented to person, place, and time.      Coordination: Coordination normal.      Deep Tendon Reflexes: Reflexes normal.   Psychiatric:         Behavior: Behavior normal.         Thought Content: Thought content normal.         Judgment: Judgment normal.         Assessment/Plan   Diagnoses and all orders for this visit:    1. Routine general medical examination at a health care facility (Primary)  -     POC Urinalysis Dipstick, Automated  -     CBC (No Diff); Future  -     TSH Rfx On Abnormal To Free T4; Future  -     Lipid Panel; Future  -     Comprehensive Metabolic Panel; Future  Regular exercise/healthy diet. BSE q month. Sunscreen use encouraged. Check fasting labs  Lilian due 10/23  Colon due 3/25 (Pezzi)  DT due 8/28  DEXA due 12/23 (osteopenia)-we will order  Shingles- she had shingrix  paps through GYN (Izabella Bob)    2. Vitamin D deficiency  -     Vitamin D,25-Hydroxy; Future    3. Situational anxiety-uses Xanax for flying.  Refill today.  She has signed controlled substance contract.  Cameron appropriate  -     ALPRAZolam (Xanax) 0.25 MG tablet; 1/2-1 qd as needed for anxiety  Dispense: 30 tablet; Refill: 0    4. Screening for osteoporosis  -     DEXA Bone Density Axial; Future          Pneumonia vaccine next year

## 2023-08-30 ENCOUNTER — LAB (OUTPATIENT)
Dept: INTERNAL MEDICINE | Facility: CLINIC | Age: 63
End: 2023-08-30
Payer: COMMERCIAL

## 2023-08-30 ENCOUNTER — OFFICE VISIT (OUTPATIENT)
Dept: INTERNAL MEDICINE | Facility: CLINIC | Age: 63
End: 2023-08-30
Payer: COMMERCIAL

## 2023-08-30 VITALS
HEIGHT: 62 IN | SYSTOLIC BLOOD PRESSURE: 108 MMHG | HEART RATE: 72 BPM | WEIGHT: 129 LBS | TEMPERATURE: 98 F | DIASTOLIC BLOOD PRESSURE: 76 MMHG | BODY MASS INDEX: 23.74 KG/M2 | OXYGEN SATURATION: 99 %

## 2023-08-30 DIAGNOSIS — Z00.00 ROUTINE GENERAL MEDICAL EXAMINATION AT A HEALTH CARE FACILITY: Primary | ICD-10-CM

## 2023-08-30 DIAGNOSIS — F41.8 SITUATIONAL ANXIETY: ICD-10-CM

## 2023-08-30 DIAGNOSIS — Z13.820 SCREENING FOR OSTEOPOROSIS: ICD-10-CM

## 2023-08-30 DIAGNOSIS — Z00.00 ROUTINE GENERAL MEDICAL EXAMINATION AT A HEALTH CARE FACILITY: ICD-10-CM

## 2023-08-30 DIAGNOSIS — E55.9 VITAMIN D DEFICIENCY: ICD-10-CM

## 2023-08-30 LAB
BILIRUB BLD-MCNC: NEGATIVE MG/DL
CLARITY, POC: CLEAR
COLOR UR: YELLOW
DEPRECATED RDW RBC AUTO: 41.6 FL (ref 37–54)
ERYTHROCYTE [DISTWIDTH] IN BLOOD BY AUTOMATED COUNT: 11.9 % (ref 12.3–15.4)
EXPIRATION DATE: NORMAL
GLUCOSE UR STRIP-MCNC: NEGATIVE MG/DL
HCT VFR BLD AUTO: 39.6 % (ref 34–46.6)
HGB BLD-MCNC: 13.5 G/DL (ref 12–15.9)
KETONES UR QL: NEGATIVE
LEUKOCYTE EST, POC: NEGATIVE
Lab: NORMAL
MCH RBC QN AUTO: 32.5 PG (ref 26.6–33)
MCHC RBC AUTO-ENTMCNC: 34.1 G/DL (ref 31.5–35.7)
MCV RBC AUTO: 95.4 FL (ref 79–97)
NITRITE UR-MCNC: NEGATIVE MG/ML
PH UR: 7.5 [PH] (ref 5–8)
PLATELET # BLD AUTO: 297 10*3/MM3 (ref 140–450)
PMV BLD AUTO: 10.2 FL (ref 6–12)
PROT UR STRIP-MCNC: NEGATIVE MG/DL
RBC # BLD AUTO: 4.15 10*6/MM3 (ref 3.77–5.28)
RBC # UR STRIP: NEGATIVE /UL
SP GR UR: 1.01 (ref 1–1.03)
UROBILINOGEN UR QL: NORMAL
WBC NRBC COR # BLD: 5.5 10*3/MM3 (ref 3.4–10.8)

## 2023-08-30 PROCEDURE — 85027 COMPLETE CBC AUTOMATED: CPT | Performed by: INTERNAL MEDICINE

## 2023-08-30 PROCEDURE — 84443 ASSAY THYROID STIM HORMONE: CPT | Performed by: INTERNAL MEDICINE

## 2023-08-30 PROCEDURE — 80053 COMPREHEN METABOLIC PANEL: CPT | Performed by: INTERNAL MEDICINE

## 2023-08-30 PROCEDURE — 80061 LIPID PANEL: CPT | Performed by: INTERNAL MEDICINE

## 2023-08-30 PROCEDURE — 82306 VITAMIN D 25 HYDROXY: CPT | Performed by: INTERNAL MEDICINE

## 2023-08-30 PROCEDURE — 36415 COLL VENOUS BLD VENIPUNCTURE: CPT | Performed by: INTERNAL MEDICINE

## 2023-08-30 RX ORDER — ALPRAZOLAM 0.25 MG/1
TABLET ORAL
Qty: 30 TABLET | Refills: 0 | Status: SHIPPED | OUTPATIENT
Start: 2023-08-30

## 2023-08-31 LAB
25(OH)D3 SERPL-MCNC: 35.8 NG/ML (ref 30–100)
ALBUMIN SERPL-MCNC: 4.4 G/DL (ref 3.5–5.2)
ALBUMIN/GLOB SERPL: 1.7 G/DL
ALP SERPL-CCNC: 68 U/L (ref 39–117)
ALT SERPL W P-5'-P-CCNC: 16 U/L (ref 1–33)
ANION GAP SERPL CALCULATED.3IONS-SCNC: 12 MMOL/L (ref 5–15)
AST SERPL-CCNC: 24 U/L (ref 1–32)
BILIRUB SERPL-MCNC: 0.4 MG/DL (ref 0–1.2)
BUN SERPL-MCNC: 12 MG/DL (ref 8–23)
BUN/CREAT SERPL: 14.8 (ref 7–25)
CALCIUM SPEC-SCNC: 9.4 MG/DL (ref 8.6–10.5)
CHLORIDE SERPL-SCNC: 104 MMOL/L (ref 98–107)
CHOLEST SERPL-MCNC: 251 MG/DL (ref 0–200)
CO2 SERPL-SCNC: 24 MMOL/L (ref 22–29)
CREAT SERPL-MCNC: 0.81 MG/DL (ref 0.57–1)
EGFRCR SERPLBLD CKD-EPI 2021: 81.2 ML/MIN/1.73
GLOBULIN UR ELPH-MCNC: 2.6 GM/DL
GLUCOSE SERPL-MCNC: 88 MG/DL (ref 65–99)
HDLC SERPL-MCNC: 91 MG/DL (ref 40–60)
LDLC SERPL CALC-MCNC: 154 MG/DL (ref 0–100)
LDLC/HDLC SERPL: 1.67 {RATIO}
POTASSIUM SERPL-SCNC: 4.6 MMOL/L (ref 3.5–5.2)
PROT SERPL-MCNC: 7 G/DL (ref 6–8.5)
SODIUM SERPL-SCNC: 140 MMOL/L (ref 136–145)
TRIGL SERPL-MCNC: 42 MG/DL (ref 0–150)
TSH SERPL DL<=0.05 MIU/L-ACNC: 1.01 UIU/ML (ref 0.27–4.2)
VLDLC SERPL-MCNC: 6 MG/DL (ref 5–40)

## 2023-12-06 ENCOUNTER — TRANSCRIBE ORDERS (OUTPATIENT)
Dept: INTERNAL MEDICINE | Facility: CLINIC | Age: 63
End: 2023-12-06
Payer: COMMERCIAL

## 2023-12-06 DIAGNOSIS — Z12.31 VISIT FOR SCREENING MAMMOGRAM: Primary | ICD-10-CM

## 2023-12-13 ENCOUNTER — HOSPITAL ENCOUNTER (OUTPATIENT)
Dept: MAMMOGRAPHY | Facility: HOSPITAL | Age: 63
Discharge: HOME | End: 2023-12-13
Admitting: INTERNAL MEDICINE
Payer: COMMERCIAL

## 2023-12-13 DIAGNOSIS — Z12.31 VISIT FOR SCREENING MAMMOGRAM: ICD-10-CM

## 2023-12-13 PROCEDURE — 77063 BREAST TOMOSYNTHESIS BI: CPT

## 2023-12-13 PROCEDURE — 77067 SCR MAMMO BI INCL CAD: CPT

## 2023-12-19 ENCOUNTER — HOSPITAL ENCOUNTER (OUTPATIENT)
Dept: BONE DENSITY | Facility: HOSPITAL | Age: 63
Discharge: HOME | End: 2023-12-19
Admitting: INTERNAL MEDICINE
Payer: COMMERCIAL

## 2023-12-19 DIAGNOSIS — Z13.820 SCREENING FOR OSTEOPOROSIS: ICD-10-CM

## 2023-12-19 PROCEDURE — 77080 DXA BONE DENSITY AXIAL: CPT

## 2024-08-29 ENCOUNTER — LAB (OUTPATIENT)
Dept: INTERNAL MEDICINE | Facility: CLINIC | Age: 64
End: 2024-08-29
Payer: MEDICARE

## 2024-08-29 ENCOUNTER — OFFICE VISIT (OUTPATIENT)
Dept: INTERNAL MEDICINE | Facility: CLINIC | Age: 64
End: 2024-08-29
Payer: MEDICARE

## 2024-08-29 VITALS
OXYGEN SATURATION: 100 % | DIASTOLIC BLOOD PRESSURE: 70 MMHG | TEMPERATURE: 96.6 F | HEIGHT: 62 IN | RESPIRATION RATE: 14 BRPM | BODY MASS INDEX: 23.63 KG/M2 | WEIGHT: 128.4 LBS | HEART RATE: 70 BPM | SYSTOLIC BLOOD PRESSURE: 98 MMHG

## 2024-08-29 DIAGNOSIS — Z23 NEED FOR VACCINATION: ICD-10-CM

## 2024-08-29 DIAGNOSIS — E78.00 PURE HYPERCHOLESTEROLEMIA: ICD-10-CM

## 2024-08-29 DIAGNOSIS — Z00.00 WELCOME TO MEDICARE PREVENTIVE VISIT: Primary | ICD-10-CM

## 2024-08-29 DIAGNOSIS — F41.8 SITUATIONAL ANXIETY: ICD-10-CM

## 2024-08-29 DIAGNOSIS — K58.0 IRRITABLE BOWEL SYNDROME WITH DIARRHEA: ICD-10-CM

## 2024-08-29 LAB
ALBUMIN SERPL-MCNC: 4.2 G/DL (ref 3.5–5.2)
ALBUMIN/GLOB SERPL: 1.7 G/DL
ALP SERPL-CCNC: 71 U/L (ref 39–117)
ALT SERPL W P-5'-P-CCNC: 15 U/L (ref 1–33)
ANION GAP SERPL CALCULATED.3IONS-SCNC: 9 MMOL/L (ref 5–15)
AST SERPL-CCNC: 21 U/L (ref 1–32)
BILIRUB SERPL-MCNC: 0.4 MG/DL (ref 0–1.2)
BUN SERPL-MCNC: 12 MG/DL (ref 8–23)
BUN/CREAT SERPL: 16.7 (ref 7–25)
CALCIUM SPEC-SCNC: 9.4 MG/DL (ref 8.6–10.5)
CHLORIDE SERPL-SCNC: 105 MMOL/L (ref 98–107)
CHOLEST SERPL-MCNC: 247 MG/DL (ref 0–200)
CO2 SERPL-SCNC: 25 MMOL/L (ref 22–29)
CREAT SERPL-MCNC: 0.72 MG/DL (ref 0.57–1)
DEPRECATED RDW RBC AUTO: 41.1 FL (ref 37–54)
EGFRCR SERPLBLD CKD-EPI 2021: 92.9 ML/MIN/1.73
ERYTHROCYTE [DISTWIDTH] IN BLOOD BY AUTOMATED COUNT: 12.1 % (ref 12.3–15.4)
GLOBULIN UR ELPH-MCNC: 2.5 GM/DL
GLUCOSE SERPL-MCNC: 85 MG/DL (ref 65–99)
HCT VFR BLD AUTO: 39.9 % (ref 34–46.6)
HDLC SERPL-MCNC: 98 MG/DL (ref 40–60)
HGB BLD-MCNC: 13.4 G/DL (ref 12–15.9)
LDLC SERPL CALC-MCNC: 142 MG/DL (ref 0–100)
LDLC/HDLC SERPL: 1.42 {RATIO}
MCH RBC QN AUTO: 31.8 PG (ref 26.6–33)
MCHC RBC AUTO-ENTMCNC: 33.6 G/DL (ref 31.5–35.7)
MCV RBC AUTO: 94.5 FL (ref 79–97)
PLATELET # BLD AUTO: 286 10*3/MM3 (ref 140–450)
PMV BLD AUTO: 10.1 FL (ref 6–12)
POTASSIUM SERPL-SCNC: 4.5 MMOL/L (ref 3.5–5.2)
PROT SERPL-MCNC: 6.7 G/DL (ref 6–8.5)
RBC # BLD AUTO: 4.22 10*6/MM3 (ref 3.77–5.28)
SODIUM SERPL-SCNC: 139 MMOL/L (ref 136–145)
TRIGL SERPL-MCNC: 47 MG/DL (ref 0–150)
TSH SERPL DL<=0.05 MIU/L-ACNC: 1.55 UIU/ML (ref 0.27–4.2)
VLDLC SERPL-MCNC: 7 MG/DL (ref 5–40)
WBC NRBC COR # BLD AUTO: 4.68 10*3/MM3 (ref 3.4–10.8)

## 2024-08-29 PROCEDURE — 84443 ASSAY THYROID STIM HORMONE: CPT | Performed by: INTERNAL MEDICINE

## 2024-08-29 PROCEDURE — 80061 LIPID PANEL: CPT | Performed by: INTERNAL MEDICINE

## 2024-08-29 PROCEDURE — 80053 COMPREHEN METABOLIC PANEL: CPT | Performed by: INTERNAL MEDICINE

## 2024-08-29 PROCEDURE — 85027 COMPLETE CBC AUTOMATED: CPT | Performed by: INTERNAL MEDICINE

## 2024-08-29 PROCEDURE — 36415 COLL VENOUS BLD VENIPUNCTURE: CPT | Performed by: INTERNAL MEDICINE

## 2024-08-29 RX ORDER — ALPRAZOLAM 0.25 MG
TABLET ORAL
Qty: 30 TABLET | Refills: 0 | Status: SHIPPED | OUTPATIENT
Start: 2024-08-29

## 2024-08-29 NOTE — LETTER
[facility]  Vaccine Consent Form    Patient Name:  Lila Rodriguez  Patient :  1960     Vaccine(s) Ordered    Pneumococcal Conjugate Vaccine 20-Valent (PCV20)        Screening Checklist  The following questions should be completed prior to vaccination. If you answer “yes” to any question, it does not necessarily mean you should not be vaccinated. It just means we may need to clarify or ask more questions. If a question is unclear, please ask your healthcare provider to explain it.    Yes No   Any fever or moderate to severe illness today (mild illness and/or antibiotic treatment are not contraindications)?     Do you have a history of a serious reaction to any previous vaccinations, such as anaphylaxis, encephalopathy within 7 days, Guillain-[place]ville syndrome within 6 weeks, seizure?     Have you received any live vaccine(s) (e.g MMR, CARMELLA) or any other vaccines in the last month (to ensure duplicate doses aren't given)?     Do you have an anaphylactic allergy to latex (DTaP, DTaP-IPV, Hep A, Hep B, MenB, RV, Td, Tdap), baker’s yeast (Hep B, HPV), polysorbates (RSV, nirsevimab, PCV 20, Rotavirrus, Tdap, Shingrix), or gelatin (CARMELLA, MMR)?     Do you have an anaphylactic allergy to neomycin (Rabies, CARMELLA, MMR, IPV, Hep A), polymyxin B (IPV), or streptomycin (IPV)?      Any cancer, leukemia, AIDS, or other immune system disorder? (CARMELLA, MMR, RV)     Do you have a parent, brother, or sister with an immune system problem (if immune competence of vaccine recipient clinically verified, can proceed)? (MMR, CARMELLA)     Any recent steroid treatments for >2 weeks, chemotherapy, or radiation treatment? (CARMELLA, MMR)     Have you received antibody-containing blood transfusions or IVIG in the past 11 months (recommended interval is dependent on product)? (MMR, CARMELLA)     Have you taken antiviral drugs (acyclovir, famciclovir, valacyclovir for CARMELLA) in the last 24 or 48 hours, respectively?      Are you pregnant or planning to become  "pregnant within 1 month? (CARMELLA, MMR, HPV, IPV, MenB, Abrexvy; For Hep B- refer to Engerix-B; For RSV - Abrysvo is indicated for 32-36 weeks of pregnancy from September to January)     For infants, have you ever been told your child has had intussusception or a medical emergency involving obstruction of the intestine (Rotavirus)? If not for an infant, can skip this question.         *Ordering Physicians/APC should be consulted if \"yes\" is checked by the patient or guardian above.  I have received, read, and understand the Vaccine Information Statement (VIS) for each vaccine ordered.  I have considered my or my child's health status as well as the health status of my close contacts.  I have taken the opportunity to discuss my vaccine questions with my or my child's health care provider.   I have requested that the ordered vaccine(s) be given to me or my child.  I understand the benefits and risks of the vaccines.  I understand that I should remain in the clinic for 15 minutes after receiving the vaccine(s).  _________________________________________________________  Signature of Patient or Parent/Legal Guardian ____________________  Date     "

## 2024-08-29 NOTE — ASSESSMENT & PLAN NOTE
Patient uses Xanax infrequently.  She has signed controlled substance contract today.  We have not done a urine drug screen since she takes so rarely.  Cameron salazar

## 2024-08-29 NOTE — PROGRESS NOTES
Subjective   The ABCs of the Annual Wellness Visit  Medicare Wellness Visit      Lila Rodriguez is a 64 y.o. patient who presents for a welcome to Medicare Wellness Visit.    The following portions of the patient's history were reviewed and   updated as appropriate: allergies, current medications, past family history, past medical history, past social history, past surgical history, and problem list.    Compared to one year ago, the patient's physical   health is the same.  Compared to one year ago, the patient's mental   health is the same.    Recent Hospitalizations:  She was not admitted to the hospital during the last year.     Current Medical Providers:  Patient Care Team:  Emily Ott MD as PCP - General (Internal Medicine)  Olena Heath MD as Surgeon (Orthopedic Surgery)  Mechelle Min MD as Consulting Physician (Dermatology)  Maxi Quach MD as Consulting Physician (Gastroenterology)  Izabella Bob APRN as Nurse Practitioner (Obstetrics and Gynecology)    Outpatient Medications Prior to Visit   Medication Sig Dispense Refill    Calcium Carb-Cholecalciferol (calcium carbonate-cholecalciferol) 500-400 MG-UNIT tablet tablet Take 2 tablets by mouth Daily.      methylcellulose, Laxative, (CITRUCEL) 500 MG tablet tablet Take 4 tablets by mouth Every 4 (Four) Hours As Needed.      VITAMIN D PO Take 1 capsule by mouth Daily.      ALPRAZolam (Xanax) 0.25 MG tablet 1/2-1 qd as needed for anxiety 30 tablet 0    triamcinolone (KENALOG) 0.5 % ointment Apply 1 application topically to the appropriate area as directed 2 (Two) Times a Day. 15 g 0    TURMERIC PO Take 2 tablets by mouth Daily.       No facility-administered medications prior to visit.     No opioid medication identified on active medication list. I have reviewed chart for other potential  high risk medication/s and harmful drug interactions in the elderly.      Aspirin is not on active medication list.  Aspirin use is  "not indicated based on review of current medical condition/s. Risk of harm outweighs potential benefits.  .    Patient Active Problem List   Diagnosis    Polyp of colon    Menopause    IBS (irritable bowel syndrome)    Situational anxiety    Closed fracture of left wrist    Bell's palsy     Advance Care Planning Advance Directive is not on file.  ACP discussion was held with the patient during this visit. Patient does not have an advance directive, information provided.            Objective   Vitals:    08/29/24 0924   BP: 98/70   BP Location: Right arm   Patient Position: Sitting   Cuff Size: Adult   Pulse: 70   Resp: 14   Temp: 96.6 °F (35.9 °C)   TempSrc: Infrared   SpO2: 100%   Weight: 58.2 kg (128 lb 6.4 oz)   Height: 157.6 cm (62.05\")       Estimated body mass index is 23.45 kg/m² as calculated from the following:    Height as of this encounter: 157.6 cm (62.05\").    Weight as of this encounter: 58.2 kg (128 lb 6.4 oz).    BMI is within normal parameters. No other follow-up for BMI required.         Gait and Balance Evaluation:  Normal  Does the patient have evidence of cognitive impairment? No  Lab Results   Component Value Date    TRIG 47 08/29/2024    HDL 98 (H) 08/29/2024     (H) 08/29/2024    VLDL 7 08/29/2024       ECG 12 Lead    Date/Time: 8/29/2024 7:50 AM  Performed by: Emily Ott MD    Authorized by: Emily Ott MD  Comparison: not compared with previous ECG   Previous ECG: no previous ECG available  Rhythm: sinus rhythm  Rate: normal  Conduction: conduction normal  ST Segments: ST segments normal  T Waves: T waves normal  QRS axis: normal    Clinical impression: normal ECG                                                                                                Health  Risk Assessment    Smoking Status:  Social History     Tobacco Use   Smoking Status Former    Current packs/day: 0.00    Average packs/day: 1 pack/day for 20.0 years (20.0 ttl pk-yrs)    Types: Cigarettes    " Start date: 1981    Quit date: 2001    Years since quittin.6   Smokeless Tobacco Never   Tobacco Comments    quit age 41 1ppd x 20 yrs     Alcohol Consumption:  Social History     Substance and Sexual Activity   Alcohol Use Yes    Comment: wine 1/night       Fall Risk Screen  JIMMYADI Fall Risk Assessment was completed, and patient is at LOW risk for falls.Assessment completed on:2024    Depression Screenin/29/2024     9:15 AM   PHQ-2/PHQ-9 Depression Screening   Little Interest or Pleasure in Doing Things 0-->not at all   Feeling Down, Depressed or Hopeless 0-->not at all   PHQ-9: Brief Depression Severity Measure Score 0     Health Habits and Functional and Cognitive Screenin/29/2024     9:14 AM   Functional & Cognitive Status   Do you have difficulty preparing food and eating? No   Do you have difficulty bathing yourself, getting dressed or grooming yourself? No   Do you have difficulty using the toilet? No   Do you have difficulty moving around from place to place? No   Do you have trouble with steps or getting out of a bed or a chair? No   Current Diet Well Balanced Diet   Dental Exam Up to date   Eye Exam Up to date   Exercise (times per week) 6 times per week   Current Exercises Include Walking;Running/Jogging;Other   Do you need help using the phone?  No   Are you deaf or do you have serious difficulty hearing?  No   Do you need help to go to places out of walking distance? No   Do you need help shopping? No   Do you need help preparing meals?  No   Do you need help with housework?  No   Do you need help with laundry? No   Do you need help taking your medications? No   Do you need help managing money? No   Do you ever drive or ride in a car without wearing a seat belt? No   Have you felt unusual stress, anger or loneliness in the last month? No   Who do you live with? Spouse   If you need help, do you have trouble finding someone available to you? No   Have you been bothered  in the last four weeks by sexual problems? No   Do you have difficulty concentrating, remembering or making decisions? No   Visual Acuity:  Vision Screening    Right eye Left eye Both eyes   Without correction 20/50 20/40 20/30   With correction 20/20 20/20 20/13     Age-appropriate Screening Schedule:  Refer to the list below for future screening recommendations based on patient's age, sex and/or medical conditions. Orders for these recommended tests are listed in the plan section. The patient has been provided with a written plan.    Health Maintenance List  Health Maintenance   Topic Date Due    ANNUAL WELLNESS VISIT  Never done    COVID-19 Vaccine (4 - 2023-24 season) 09/01/2023    INFLUENZA VACCINE  08/01/2024    COLORECTAL CANCER SCREENING  03/23/2025    LIPID PANEL  08/29/2025    MAMMOGRAM  12/13/2025    DXA SCAN  12/19/2025    TDAP/TD VACCINES (3 - Td or Tdap) 08/03/2028    HEPATITIS C SCREENING  Completed    Pneumococcal Vaccine 65+  Completed    ZOSTER VACCINE  Completed    PAP SMEAR  Discontinued                                                                                                                                                CMS Preventative Services Quick Reference  Risk Factors Identified During Encounter  None Identified    The above risks/problems have been discussed with the patient.  Pertinent information has been shared with the patient in the After Visit Summary.  An After Visit Summary and PPPS were made available to the patient.    Follow Up:   Next Medicare Wellness visit to be scheduled in 1 year.         Additional E&M Note during same encounter follows:  Patient has additional, significant, and separately identifiable condition(s)/problem(s) that require work above and beyond the Medicare Wellness Visit     Chief Complaint  Welcome To Medicare    Subjective   HPI      Review of Systems   Constitutional:  Negative for activity change, appetite change, fatigue and fever.  "  Respiratory:  Negative for shortness of breath.    Cardiovascular:  Negative for chest pain and leg swelling.   Genitourinary:  Negative for dysuria.   Allergic/Immunologic: Negative for immunocompromised state.   Neurological:  Negative for seizures, syncope, speech difficulty, weakness and confusion.      exercise: runs/walks daily, weights  Diet: healthy overall, takes citricel, Zija.  calcium/D.  1 wine nightly     Vitamin D deficiency-she takes the calcium/D supplement regularly     Situational anxiety -uses xanax when she flies.  Swould like to go ahead and get a refill today.  Usually just feels 30 in a year      IBS - Does fiber regualrly and has been well controlled         Objective   Vital Signs:  BP 98/70 (BP Location: Right arm, Patient Position: Sitting, Cuff Size: Adult)   Pulse 70   Temp 96.6 °F (35.9 °C) (Infrared)   Resp 14   Ht 157.6 cm (62.05\")   Wt 58.2 kg (128 lb 6.4 oz)   SpO2 100%   BMI 23.45 kg/m²   Physical Exam  Vitals and nursing note reviewed.   Constitutional:       General: She is not in acute distress.     Appearance: She is well-developed. She is not diaphoretic.   HENT:      Head: Normocephalic and atraumatic.      Right Ear: External ear normal.      Left Ear: External ear normal.      Nose: Nose normal.      Mouth/Throat:      Pharynx: No oropharyngeal exudate.   Eyes:      General: No scleral icterus.        Right eye: No discharge.         Left eye: No discharge.      Conjunctiva/sclera: Conjunctivae normal.      Pupils: Pupils are equal, round, and reactive to light.   Neck:      Thyroid: No thyromegaly.   Cardiovascular:      Rate and Rhythm: Normal rate and regular rhythm.      Heart sounds: Normal heart sounds. No murmur heard.     No friction rub. No gallop.   Pulmonary:      Effort: Pulmonary effort is normal. No respiratory distress.      Breath sounds: Normal breath sounds. No wheezing or rales.   Abdominal:      General: Bowel sounds are normal. There is no " distension.      Palpations: Abdomen is soft. There is no mass.      Tenderness: There is no abdominal tenderness. There is no guarding or rebound.   Musculoskeletal:         General: No deformity. Normal range of motion.      Cervical back: Normal range of motion and neck supple.   Lymphadenopathy:      Cervical: No cervical adenopathy.   Skin:     General: Skin is warm and dry.      Coloration: Skin is not pale.      Findings: No erythema or rash.   Neurological:      Mental Status: She is alert and oriented to person, place, and time.      Coordination: Coordination normal.      Deep Tendon Reflexes: Reflexes normal.   Psychiatric:         Behavior: Behavior normal.         Thought Content: Thought content normal.         Judgment: Judgment normal.                 Assessment and Plan               Welcome to Medicare preventive visit  Regular exercise/healthy diet. BSE q month. Sunscreen use encouraged. Check fasting labs  Living will  Lilian due 12/24  Colon due 3/25 (Pezzi)  DT due 8/28  DEXA due 12/25 (osteopenia)  Shingles- she had shingrix  Pneumonia vaccine - prevnar 20 given today  paps through GYN (Izabella Bob)  Irritable bowel syndrome with diarrhea    Pure hypercholesterolemia  Check levels today   Need for vaccination    Situational anxiety  Patient uses Xanax infrequently.  She has signed controlled substance contract today.  We have not done a urine drug screen since she takes so rarely.  Cameron appropriate    Orders Placed This Encounter   Procedures    Pneumococcal Conjugate Vaccine 20-Valent (PCV20)    CBC (No Diff)     Standing Status:   Future     Number of Occurrences:   1     Order Specific Question:   Release to patient     Answer:   Routine Release [[id number]]    TSH Rfx On Abnormal To Free T4     Standing Status:   Future     Number of Occurrences:   1     Order Specific Question:   Release to patient     Answer:   Routine Release [[id number]]    Lipid Panel     Standing Status:   Future      Number of Occurrences:   1     Order Specific Question:   Release to patient     Answer:   Routine Release [[id number]]    Comprehensive Metabolic Panel     Standing Status:   Future     Number of Occurrences:   1     Order Specific Question:   Release to patient     Answer:   Routine Release [[id number]]    ECG 12 Lead     This order was created via procedure documentation     Order Specific Question:   Release to patient     Answer:   Routine Release [[id number]]     New Medications Ordered This Visit   Medications    ALPRAZolam (Xanax) 0.25 MG tablet     Si/2-1 qd as needed for anxiety     Dispense:  30 tablet     Refill:  0          Follow Up   Return in about 1 year (around 2025) for Medicare Wellness.  Patient was given instructions and counseling regarding her condition or for health maintenance advice. Please see specific information pulled into the AVS if appropriate.

## 2025-02-28 ENCOUNTER — TRANSCRIBE ORDERS (OUTPATIENT)
Dept: ADMINISTRATIVE | Facility: HOSPITAL | Age: 65
End: 2025-02-28
Payer: MEDICARE

## 2025-02-28 DIAGNOSIS — Z12.31 ENCOUNTER FOR SCREENING MAMMOGRAM FOR MALIGNANT NEOPLASM OF BREAST: Primary | ICD-10-CM

## 2025-03-26 RX ORDER — SODIUM, POTASSIUM,MAG SULFATES 17.5-3.13G
SOLUTION, RECONSTITUTED, ORAL ORAL
Qty: 354 ML | Refills: 0 | Status: SHIPPED | OUTPATIENT
Start: 2025-03-26

## 2025-04-01 ENCOUNTER — TELEPHONE (OUTPATIENT)
Dept: GASTROENTEROLOGY | Facility: CLINIC | Age: 65
End: 2025-04-01

## 2025-04-01 NOTE — TELEPHONE ENCOUNTER
Provider: JESUS LORENZO    Caller: Lila Rodriguez    Relationship to Patient: Self    Phone Number: [phone]    Reason for Call: PATIENT CALLED IN AND STATED THAT SHE NEEDS TO RESCHEDULE UPCOMING PROCEDURE SCHEDULED FOR 04/09/2025. PLEASE CALL BACK ANYTIME, OKAY TO LEAVE VM.

## 2025-04-07 RX ORDER — SODIUM, POTASSIUM,MAG SULFATES 17.5-3.13G
SOLUTION, RECONSTITUTED, ORAL ORAL
Qty: 354 ML | Refills: 0 | Status: SHIPPED | OUTPATIENT
Start: 2025-04-07

## 2025-04-16 ENCOUNTER — OUTSIDE FACILITY SERVICE (OUTPATIENT)
Dept: GASTROENTEROLOGY | Facility: CLINIC | Age: 65
End: 2025-04-16
Payer: MEDICARE

## 2025-04-16 PROCEDURE — 45385 COLONOSCOPY W/LESION REMOVAL: CPT | Performed by: INTERNAL MEDICINE

## 2025-05-28 ENCOUNTER — HOSPITAL ENCOUNTER (OUTPATIENT)
Dept: MAMMOGRAPHY | Facility: HOSPITAL | Age: 65
Discharge: HOME | End: 2025-05-28
Admitting: INTERNAL MEDICINE
Payer: MEDICARE

## 2025-05-28 DIAGNOSIS — Z12.31 ENCOUNTER FOR SCREENING MAMMOGRAM FOR MALIGNANT NEOPLASM OF BREAST: ICD-10-CM

## 2025-05-28 LAB
NCCN CRITERIA FLAG: NORMAL
TYRER CUZICK SCORE: 5.7

## 2025-05-28 PROCEDURE — 77067 SCR MAMMO BI INCL CAD: CPT

## 2025-05-28 PROCEDURE — 77063 BREAST TOMOSYNTHESIS BI: CPT

## 2025-05-28 PROCEDURE — 77067 SCR MAMMO BI INCL CAD: CPT | Performed by: RADIOLOGY

## 2025-05-28 PROCEDURE — 77063 BREAST TOMOSYNTHESIS BI: CPT | Performed by: RADIOLOGY

## 2025-06-11 ENCOUNTER — OFFICE VISIT (OUTPATIENT)
Dept: INTERNAL MEDICINE | Facility: CLINIC | Age: 65
End: 2025-06-11
Payer: MEDICARE

## 2025-06-11 VITALS
DIASTOLIC BLOOD PRESSURE: 78 MMHG | HEIGHT: 62 IN | SYSTOLIC BLOOD PRESSURE: 128 MMHG | WEIGHT: 128.8 LBS | OXYGEN SATURATION: 99 % | TEMPERATURE: 97.5 F | HEART RATE: 68 BPM | RESPIRATION RATE: 14 BRPM | BODY MASS INDEX: 23.7 KG/M2

## 2025-06-11 DIAGNOSIS — K58.0 IRRITABLE BOWEL SYNDROME WITH DIARRHEA: ICD-10-CM

## 2025-06-11 DIAGNOSIS — R19.7 DIARRHEA, UNSPECIFIED TYPE: Primary | ICD-10-CM

## 2025-06-11 DIAGNOSIS — K51.519 LEFT SIDED COLITIS WITH UNSPECIFIED COMPLICATIONS: ICD-10-CM

## 2025-06-11 DIAGNOSIS — F41.8 SITUATIONAL ANXIETY: ICD-10-CM

## 2025-06-11 RX ORDER — DICYCLOMINE HYDROCHLORIDE 10 MG/1
CAPSULE ORAL
Qty: 30 CAPSULE | Refills: 0 | Status: SHIPPED | OUTPATIENT
Start: 2025-06-11

## 2025-06-11 RX ORDER — ALPRAZOLAM 0.25 MG
TABLET ORAL
Qty: 30 TABLET | Refills: 2 | Status: SHIPPED | OUTPATIENT
Start: 2025-06-11

## 2025-06-11 NOTE — PROGRESS NOTES
Chief Complaint  Irritable Bowel Syndrome (Flare in the last couple of weeks )    Subjective          Lila Rodriguez presents to [facility]    History of Present Illness  The patient presents for evaluation of IBS with diarrhea.    Diarrhea  - started after she had chiropractic treatment for left scapular pain initiated 2.5 weeks ago.  She did not take NSAIDs for this (except maybe a day or two of ibuprofen but stopped as it did not seem to help)  - Severe diarrhea occurred after the second chiropractic adjustment, requiring four bathroom visits she was shopping.  Generally does not feel bad-no abdominal pain, no fever.  The diarrhea can start after eating but not always and she generally eats very healthy so has never been able to figure out any particular food triggers.  The episodes do not seem to be triggered by exercise.  Not always triggered by stress either but the fecal incontinence she can have with loose stool has been very stressful for her.  She will use Imodium particularly if she is traveling.  The Xanax helps as well when she is on a plane because she does get nervous about the incontinence.  Will wear depends on planes as well.  -The last 2-1/2 weeks, episodes have been occurring every few days.  Usually will have 4-5 bouts of diarrhea in the morning and then better later in the day.  -When she was traveling back from [place] she did take Three doses of Imodium so she would have diarrhea on the plane trip and led to a week without bowel movements.  - Concerned about diarrhea during upcoming flight to [place]-she leaves on 6/14    She has fecal incontinence and did see Dr. Robertson years ago but did not want to do surgery at that time.  She does have and upcoming colorectal surgeon appointment on 06/26/2025 to discuss.  She generally does not have any incontinence with solid bowel movements only with the diarrhea.  Acupuncture has been beneficial with IBS; appointment  "scheduled for tomorrow with a different acupuncturist since Dr. Ordaz is sick.   No recent antibiotic use.    Currently taking fiber - Citrucel 2 tablets twice daily  She had her colonoscopy in April with Dr. Quach and did have a polyp but otherwise was negative, repeat 3 years    Situational anxiety-she has used Xanax 1 as needed and does need a refill.  This was last filled 8/24 for dispense 30.  Usually just fills once a year         Current Outpatient Medications:     ALPRAZolam (Xanax) 0.25 MG tablet, 1/2-1 qd as needed for anxiety, Disp: 30 tablet, Rfl: 0    Calcium Carb-Cholecalciferol (calcium carbonate-cholecalciferol) 500-400 MG-UNIT tablet tablet, Take 2 tablets by mouth Daily., Disp: , Rfl:     methylcellulose, Laxative, (CITRUCEL) 500 MG tablet tablet, Take 4 tablets by mouth Every 4 (Four) Hours As Needed., Disp: , Rfl:     VITAMIN D PO, Take 1 capsule by mouth Daily., Disp: , Rfl:    The following portions of the patient's history were reviewed and updated as appropriate: allergies, current medications, past family history, past medical history, past social history, past surgical history and problem list.     Objective   Vital Signs:   /78 (BP Location: Right arm, Patient Position: Sitting, Cuff Size: Adult)   Pulse 68   Temp 97.5 °F (36.4 °C) (Infrared)   Resp 14   Ht 157.6 cm (62.05\")   Wt 58.4 kg (128 lb 12.8 oz)   SpO2 99%   BMI 23.52 kg/m²       Physical exam  Constitutional: oriented to person, place, and time.  well-developed and well-nourished. No distress.   HENT:   Head: Normocephalic and atraumatic.   Eyes: Conjunctivae and EOM are normal.   Cardiovascular: Normal rate, regular rhythm and normal heart sounds.  Exam reveals no gallop and no friction rub.    No murmur heard.  Pulmonary/Chest: Effort normal and breath sounds normal. No respiratory distress.   no wheezes.   Neurological:  alert and oriented to person, place, and time.   Skin: Skin is warm and dry. not " "diaphoretic.   Abd: soft, NTND  Psychiatric:  normal mood and affect. behavior is normal. Judgment and thought content normal.      Physical Exam     Results         Result Review :                    Lab Results   Component Value Date    WBC 4.68 08/29/2024    HGB 13.4 08/29/2024    HCT 39.9 08/29/2024    MCV 94.5 08/29/2024     08/29/2024     Lab Results   Component Value Date    GLUCOSE 85 08/29/2024    BUN 12 08/29/2024    CREATININE 0.72 08/29/2024     08/29/2024    K 4.5 08/29/2024     08/29/2024    CALCIUM 9.4 08/29/2024    PROTEINTOT 6.7 08/29/2024    ALBUMIN 4.2 08/29/2024    ALT 15 08/29/2024    AST 21 08/29/2024    ALKPHOS 71 08/29/2024    BILITOT 0.4 08/29/2024    GLOB 2.5 08/29/2024    AGRATIO 1.7 08/29/2024    BCR 16.7 08/29/2024    ANIONGAP 9.0 08/29/2024    EGFR 92.9 08/29/2024     Lab Results   Component Value Date    CHOL 247 (H) 08/29/2024    TRIG 47 08/29/2024    HDL 98 (H) 08/29/2024     (H) 08/29/2024     No results found for: \"HGBA1C\"  Lab Results   Component Value Date    TSH 1.550 08/29/2024     No results found for: \"OAHN299\"         Assessment and Plan          Assessment & Plan  1. IBS with diarrhea.  Patient has had IBS for many years but has been very well-controlled until the last 2 and half weeks.  We will check a PCR panel and stool calprotectin.  We might try a course of Xifaxan if testing is otherwise negative.  No recent antibiotic use so we will not do a C. difficile  - Bentyl prescribed PRN. Imodium advised for travel. Fiber intake increase recommended.      2.  Situational anxiety-Xanax refilled.  She has signed a controlled substance contract.  Cameron appropriate      Follow Up   No follow-ups on file.  Patient was given instructions and counseling regarding her condition or for health maintenance advice. Please see specific information pulled into the AVS if appropriate.     Patient or patient representative verbalized consent for the use of Ambient " Listening during the visit with  Emily Ott MD for chart documentation. 6/11/2025  10:16 EDT      Answers submitted by the patient for this visit:  Problem not listed (Submitted on 6/11/2025)  Chief Complaint: Other medical problem  Reason for appointment: IBS-D  anorexia: No  joint pain: No  change in stool: Yes  joint swelling: No  swollen glands: No  vertigo: No  visual change: No  Onset: 1 to 6 months  Chronicity: recurrent  Medications tried: Fiber

## 2025-06-12 ENCOUNTER — LAB (OUTPATIENT)
Dept: LAB | Facility: HOSPITAL | Age: 65
End: 2025-06-12
Payer: MEDICARE

## 2025-06-12 DIAGNOSIS — R19.7 DIARRHEA, UNSPECIFIED TYPE: ICD-10-CM

## 2025-06-12 DIAGNOSIS — K51.519 LEFT SIDED COLITIS WITH UNSPECIFIED COMPLICATIONS: ICD-10-CM

## 2025-06-12 LAB

## 2025-06-12 PROCEDURE — 83993 ASSAY FOR CALPROTECTIN FECAL: CPT

## 2025-06-12 PROCEDURE — 87507 IADNA-DNA/RNA PROBE TQ 12-25: CPT

## 2025-06-17 LAB — CALPROTECTIN STL-MCNT: 49 UG/G (ref 0–120)

## (undated) DEVICE — PATIENT RETURN ELECTRODE, SINGLE-USE, CONTACT QUALITY MONITORING, ADULT, WITH 9FT CORD, FOR PATIENTS WEIGING OVER 33LBS. (15KG): Brand: MEGADYNE

## (undated) DEVICE — BIT DRL SLD SD CUT 2.5X40MM

## (undated) DEVICE — DRSNG GZ PETROLTM XEROFORM CURAD 1X8IN STRL

## (undated) DEVICE — UNDERCAST PADDING: Brand: DEROYAL

## (undated) DEVICE — BIT DRL SLD SD CUT 2.0X40MM

## (undated) DEVICE — ANTIBACTERIAL UNDYED BRAIDED (POLYGLACTIN 910), SYNTHETIC ABSORBABLE SUTURE: Brand: COATED VICRYL

## (undated) DEVICE — 450 ML BOTTLE OF 0.05% CHLORHEXIDINE GLUCONATE IN 99.95% STERILE WATER FOR IRRIGATION, USP AND APPLICATOR.: Brand: IRRISEPT ANTIMICROBIAL WOUND LAVAGE

## (undated) DEVICE — DRVR QC UNIV T10

## (undated) DEVICE — PUMP PAIN AUTOFUSER AUTO SELCT NOBOLUS 1TO14ML/HR 550ML DISP

## (undated) DEVICE — DRVR AO CONNECT SQ TP 2MM

## (undated) DEVICE — PK EXTREM UPPR 10

## (undated) DEVICE — ARM SLING: Brand: DEROYAL

## (undated) DEVICE — SPLNT PLSTR ORTHO 5IN

## (undated) DEVICE — PENCL ROCKRSWCH MEGADYNE W/HOLSTR 10FT SS

## (undated) DEVICE — BNDG ELAS ELITE V/CLOSE 4IN 5YD LF STRL

## (undated) DEVICE — GUIDE AIMING 1.5MM

## (undated) DEVICE — GLV SURG PREMIERPRO MIC LTX PF SZ8 BRN

## (undated) DEVICE — GOWN,REINFORCE,POLY,SIRUS,BREATH SLV,XLG: Brand: MEDLINE

## (undated) DEVICE — ELECTRD BLD EZ CLN STD 2.5IN

## (undated) DEVICE — KWIRE STD TP 1.5X127MM
Type: IMPLANTABLE DEVICE | Site: WRIST | Status: NON-FUNCTIONAL
Removed: 2021-10-27

## (undated) DEVICE — SUT ETHLN 3/0 PC5 18IN 1893G

## (undated) DEVICE — DRP C/ARM MINI